# Patient Record
Sex: FEMALE | Race: WHITE | Employment: UNEMPLOYED | URBAN - METROPOLITAN AREA
[De-identification: names, ages, dates, MRNs, and addresses within clinical notes are randomized per-mention and may not be internally consistent; named-entity substitution may affect disease eponyms.]

---

## 2017-01-11 ENCOUNTER — TELEPHONE (OUTPATIENT)
Dept: FAMILY MEDICINE CLINIC | Age: 67
End: 2017-01-11

## 2017-01-11 NOTE — TELEPHONE ENCOUNTER
Patient would like to speak with Dr Ceci Aj. She did not want to tell me what she wanted to speak to him about.  She can be reached at [phone]

## 2017-01-11 NOTE — TELEPHONE ENCOUNTER
Return to call. Let her talk for 30 minutes. Pressured speech, tangential, focused on her estranged daughter and a dispute she is having over her dogs (\"accidentally\" signed her dogs into foster care and now they are placed with a family and she cannot get them back). Asked several times \"I do not understand why people behave this way\". Sounds like she is on 50 mg of Zoloft but \"ties my stomach in the knots\". She wonders if she has capacity. I took this opportunity to suggest that she see a psychiatrist.  May be a capacity evaluation is appropriate. She would like to go to [place]. Happens that the [facility] psychiatrist is there. Gave her the number for the [facility] intake.     I would like to see her soon for medication evaluation

## 2017-01-12 ENCOUNTER — TELEPHONE (OUTPATIENT)
Dept: FAMILY MEDICINE CLINIC | Age: 67
End: 2017-01-12

## 2017-01-12 DIAGNOSIS — F41.9 ANXIETY: Primary | ICD-10-CM

## 2017-01-12 NOTE — TELEPHONE ENCOUNTER
Made several calls, there are no psychiatrists in her area other than the [facility]. There is a clinical psychologist Dr Belen Rodríguez who comes to [address] [place] [address]. He doesn't prescribe medications but he could be very helpful to her. Number is [phone].  Please inform patient

## 2017-01-12 NOTE — TELEPHONE ENCOUNTER
Patient calling back to let Dr Taiwo Lazo know that the office he suggested her to go to is not taking Medicare or Medicaid patients at this time. She would like to know what to do or where to go from here.  She can be reached at [phone]

## 2017-01-19 ENCOUNTER — TELEPHONE (OUTPATIENT)
Dept: FAMILY MEDICINE CLINIC | Age: 67
End: 2017-01-19

## 2017-01-23 ENCOUNTER — TELEPHONE (OUTPATIENT)
Dept: FAMILY MEDICINE CLINIC | Age: 67
End: 2017-01-23

## 2017-01-24 ENCOUNTER — TELEPHONE (OUTPATIENT)
Dept: FAMILY MEDICINE CLINIC | Age: 67
End: 2017-01-24

## 2017-01-24 NOTE — TELEPHONE ENCOUNTER
Patient called me while I was on overnight call. She would like a letter to help with her dog case where she accidentally signed her dogs into foster care. She is asking for a letter to the effect that she was on strong medicine and therefore not able to concentrate on the contract. I am not sure this was the case but I would be willing to write a letter that her mental illness was not under good control. My impression is that she has trouble concentrating and may not be able to hold complex thoughts in her head.

## 2017-01-24 NOTE — LETTER
1/24/2017 5:45 PM 
 
Ms. Marisela Concepcion [address] [place] [address] To Whom It May Concern Ms. Christiano Martell is undergoing treatment for mental illness. I first treated her on 12/12/2016 and my impression at that time was that her mental illness was not under good medical control at the time. Based on my one exam with her I suspect that one of the effects of her illness is a lack of concentration and therefore would make it difficult to hold complex thoughts in her head. Sincerely, Robby Vegas MD

## 2017-01-26 DIAGNOSIS — F32.A ANXIETY AND DEPRESSION: ICD-10-CM

## 2017-01-26 DIAGNOSIS — F41.9 ANXIETY AND DEPRESSION: ICD-10-CM

## 2017-01-27 RX ORDER — ALPRAZOLAM 0.5 MG/1
TABLET ORAL
Qty: 90 TAB | Refills: 0 | Status: SHIPPED | OUTPATIENT
Start: 2017-01-27 | End: 2017-03-02 | Stop reason: SDUPTHER

## 2017-01-30 ENCOUNTER — TELEPHONE (OUTPATIENT)
Dept: FAMILY MEDICINE CLINIC | Age: 67
End: 2017-01-30

## 2017-02-02 ENCOUNTER — TELEPHONE (OUTPATIENT)
Dept: FAMILY MEDICINE CLINIC | Age: 67
End: 2017-02-02

## 2017-02-02 NOTE — TELEPHONE ENCOUNTER
Patient is calling wondering which counseling center she was referred to. Not sure what she is referring to. I referred her to the B and understand that she has an appointment at [facility] to see counselor and then psychiatrist.  I also referred her to a psychologist in [address] [place] [address] Dr Vandana Alarcon Number is [phone].

## 2017-02-21 DIAGNOSIS — I10 ESSENTIAL HYPERTENSION, BENIGN: ICD-10-CM

## 2017-02-21 DIAGNOSIS — E03.9 ACQUIRED HYPOTHYROIDISM: ICD-10-CM

## 2017-02-21 RX ORDER — LEVOTHYROXINE SODIUM 100 UG/1
TABLET ORAL
Qty: 30 TAB | Refills: 0 | Status: SHIPPED | OUTPATIENT
Start: 2017-02-21 | End: 2017-03-23 | Stop reason: SDUPTHER

## 2017-02-21 RX ORDER — ATENOLOL 25 MG/1
TABLET ORAL
Qty: 30 TAB | Refills: 0 | Status: SHIPPED | OUTPATIENT
Start: 2017-02-21 | End: 2017-03-23 | Stop reason: SDUPTHER

## 2017-03-02 DIAGNOSIS — F41.9 ANXIETY AND DEPRESSION: ICD-10-CM

## 2017-03-02 DIAGNOSIS — F32.A ANXIETY AND DEPRESSION: ICD-10-CM

## 2017-03-02 RX ORDER — ALPRAZOLAM 0.5 MG/1
0.5 TABLET ORAL
Qty: 60 TAB | Refills: 0 | Status: SHIPPED | OUTPATIENT
Start: 2017-03-02 | End: 2020-04-08 | Stop reason: ALTCHOICE

## 2017-03-02 NOTE — TELEPHONE ENCOUNTER
Approve Xanax refill, she is only taking it twice a day so changed the sig to twice daily as needed. Needs an appointment for further refills.   Please make this very clear to patient that as we do not want her to have to stop taking this medicine suddenly

## 2017-03-03 RX ORDER — ALPRAZOLAM 0.5 MG/1
TABLET ORAL
Qty: 90 TAB | Refills: 0 | OUTPATIENT
Start: 2017-03-03

## 2017-03-06 DIAGNOSIS — F41.9 ANXIETY AND DEPRESSION: ICD-10-CM

## 2017-03-06 DIAGNOSIS — F32.A ANXIETY AND DEPRESSION: ICD-10-CM

## 2017-03-06 RX ORDER — ALPRAZOLAM 0.5 MG/1
TABLET ORAL
Qty: 90 TAB | Refills: 0 | OUTPATIENT
Start: 2017-03-06

## 2017-03-06 NOTE — TELEPHONE ENCOUNTER
Spoke with patient. Patient aware sig changed to taking BID PRN. Patient aware appt needed for further refills. Xanax called into [facility].

## 2017-03-23 ENCOUNTER — TELEPHONE (OUTPATIENT)
Dept: FAMILY MEDICINE CLINIC | Age: 67
End: 2017-03-23

## 2017-03-23 DIAGNOSIS — E03.9 ACQUIRED HYPOTHYROIDISM: ICD-10-CM

## 2017-03-23 DIAGNOSIS — I10 ESSENTIAL HYPERTENSION, BENIGN: ICD-10-CM

## 2017-03-23 RX ORDER — MECLIZINE HYDROCHLORIDE 25 MG/1
TABLET ORAL
Qty: 30 TAB | Refills: 11 | Status: SHIPPED | OUTPATIENT
Start: 2017-03-23 | End: 2018-01-23 | Stop reason: SDUPTHER

## 2017-03-23 RX ORDER — ATENOLOL 25 MG/1
TABLET ORAL
Qty: 30 TAB | Refills: 11 | Status: SHIPPED | OUTPATIENT
Start: 2017-03-23 | End: 2018-04-09 | Stop reason: SDUPTHER

## 2017-03-23 RX ORDER — LEVOTHYROXINE SODIUM 100 UG/1
TABLET ORAL
Qty: 30 TAB | Refills: 11 | Status: SHIPPED | OUTPATIENT
Start: 2017-03-23 | End: 2018-04-09 | Stop reason: SDUPTHER

## 2017-08-09 DIAGNOSIS — I10 ESSENTIAL HYPERTENSION, BENIGN: ICD-10-CM

## 2017-08-09 RX ORDER — ATENOLOL 25 MG/1
TABLET ORAL
Qty: 30 TAB | Refills: 11 | Status: CANCELLED | OUTPATIENT
Start: 2017-08-09

## 2017-08-09 NOTE — TELEPHONE ENCOUNTER
Patient says that Meek Esqueda has told her that atenolol is on backorder and she needs this med.  She would like for this to be called in to [address] and can be reached at [phone]

## 2017-12-06 ENCOUNTER — TELEPHONE (OUTPATIENT)
Dept: CARDIOLOGY CLINIC | Age: 67
End: 2017-12-06

## 2017-12-06 NOTE — TELEPHONE ENCOUNTER
Pt would like to speak with you regarding getting an echo. Pt can be reached at [phone].     Thank you,  Evin

## 2018-01-23 RX ORDER — MECLIZINE HYDROCHLORIDE 25 MG/1
TABLET ORAL
Qty: 30 TAB | Refills: 11 | Status: SHIPPED | OUTPATIENT
Start: 2018-01-23 | End: 2019-01-30 | Stop reason: SDUPTHER

## 2018-02-07 RX ORDER — MECLIZINE HYDROCHLORIDE 25 MG/1
TABLET ORAL
Qty: 30 TAB | Refills: 11 | Status: CANCELLED | OUTPATIENT
Start: 2018-02-07

## 2018-02-09 ENCOUNTER — TELEPHONE (OUTPATIENT)
Dept: FAMILY MEDICINE CLINIC | Age: 68
End: 2018-02-09

## 2018-02-09 NOTE — TELEPHONE ENCOUNTER
Forms filled out and signed by Dr Adam Pennington today. Faxed today. Sameer Burton  Left message on patients voicemail. Sameer Burton

## 2018-04-09 DIAGNOSIS — E03.9 ACQUIRED HYPOTHYROIDISM: ICD-10-CM

## 2018-04-09 DIAGNOSIS — I10 ESSENTIAL HYPERTENSION, BENIGN: ICD-10-CM

## 2018-04-09 RX ORDER — ATENOLOL 25 MG/1
TABLET ORAL
Qty: 30 TAB | Refills: 11 | Status: SHIPPED | OUTPATIENT
Start: 2018-04-09 | End: 2019-03-27 | Stop reason: SDUPTHER

## 2018-04-09 RX ORDER — LEVOTHYROXINE SODIUM 100 UG/1
TABLET ORAL
Qty: 30 TAB | Refills: 11 | Status: SHIPPED | OUTPATIENT
Start: 2018-04-09 | End: 2019-03-27 | Stop reason: SDUPTHER

## 2018-04-13 NOTE — TELEPHONE ENCOUNTER
Have not been able to reach her left messages. Do you want me to cancel other refills until she sets up an apt.

## 2019-01-30 RX ORDER — MECLIZINE HYDROCHLORIDE 25 MG/1
TABLET ORAL
Qty: 30 TAB | Refills: 11 | Status: SHIPPED | OUTPATIENT
Start: 2019-01-30 | End: 2020-04-08 | Stop reason: ALTCHOICE

## 2019-03-27 DIAGNOSIS — E03.9 ACQUIRED HYPOTHYROIDISM: ICD-10-CM

## 2019-03-27 DIAGNOSIS — I10 ESSENTIAL HYPERTENSION, BENIGN: ICD-10-CM

## 2019-03-27 RX ORDER — ATENOLOL 25 MG/1
TABLET ORAL
Qty: 30 TAB | Refills: 0 | Status: SHIPPED | OUTPATIENT
Start: 2019-03-27 | End: 2020-04-08 | Stop reason: SDUPTHER

## 2019-03-27 RX ORDER — LEVOTHYROXINE SODIUM 100 UG/1
TABLET ORAL
Qty: 30 TAB | Refills: 0 | Status: SHIPPED | OUTPATIENT
Start: 2019-03-27 | End: 2020-04-08 | Stop reason: DRUGHIGH

## 2019-03-27 NOTE — TELEPHONE ENCOUNTER
From [facility]:  Patient needs a refill on her medications called to [facility] [place] [address], number on file.  Contact is [phone]

## 2019-04-25 ENCOUNTER — TELEPHONE (OUTPATIENT)
Dept: FAMILY MEDICINE CLINIC | Age: 69
End: 2019-04-25

## 2019-04-30 ENCOUNTER — TELEPHONE (OUTPATIENT)
Dept: FAMILY MEDICINE CLINIC | Age: 69
End: 2019-04-30

## 2019-04-30 NOTE — LETTER
5/10/2019 10:06 AM 
 
Ms. Stefan Reina [address] [facility] [address] Dear Ms. Chaparro: 
 
We've missed you! Please call our office at [phone] and schedule a follow up appointment for your continued care. Sincerely, Loyda Croft MD

## 2019-04-30 NOTE — TELEPHONE ENCOUNTER
Patient is calling to speak with a nurse. She stated she went to urgent care and they told her that her thyroid levels are high.

## 2019-05-10 NOTE — TELEPHONE ENCOUNTER
Left message for pt to return my call. After multiple attempts to contact pt, made letter for pt to come in for a f/u. Closing encounter.

## 2020-01-27 NOTE — LETTER
1/29/2020 Lucretia Heller [address] [facility] [address] Dear Ms. Orlandoishalucas Poon, 
 
Med Refill Appointment Needed: We have received a medication renewal request for you but have been unable to reach you by phone to discuss this further. Without office visits and appropriate monitoring, your healthcare provider cannot be sure that the medication they are prescribing is treating your conditions effectively. Please contact the office at the number above to schedule a follow up visit. Sincerely, Haile Storm MD  
 
Be aware that although these visits are important to keeping you well, your visit may be subject to fees such as co-pays, deductibles, etc.  Please contact your insurance carrier for your specific plan details if you are unsure.

## 2020-01-28 RX ORDER — MECLIZINE HYDROCHLORIDE 25 MG/1
TABLET ORAL
Qty: 30 TAB | Refills: 0 | OUTPATIENT
Start: 2020-01-28

## 2020-01-28 NOTE — TELEPHONE ENCOUNTER
Declined the meclizine refill.   Patient has not been seen since 12/2016    If she is needs it meclizine is available over-the-counter

## 2020-01-29 ENCOUNTER — TELEPHONE (OUTPATIENT)
Dept: FAMILY MEDICINE CLINIC | Age: 70
End: 2020-01-29

## 2020-01-29 NOTE — TELEPHONE ENCOUNTER
Message from Beatrice Moore   Received: Today   Message Contents   Lacy, [address] [facility]   Phone Number: [phone]             Caller's first and last name and relationship (if not the patient): n/a   Best contact number(s): [phone]   Whose call is being returned: unknown   Details to clarify the request: Pt received 2 calls. No message or name was given. If unable to get in contact with pt, please leave a voice message.

## 2020-01-29 NOTE — TELEPHONE ENCOUNTER
Mailbox was full and cannot accept any messages at this time. Letter has been sent to pt, she needs appointment if she wants refill on meclizine since it's been longer than 2 years since she was here. Please see previous phone message by Dr. Breann Serrato on 1/27/2020.

## 2020-04-02 ENCOUNTER — DOCUMENTATION ONLY (OUTPATIENT)
Dept: INTERNAL MEDICINE CLINIC | Age: 70
End: 2020-04-02

## 2020-04-06 ENCOUNTER — DOCUMENTATION ONLY (OUTPATIENT)
Dept: INTERNAL MEDICINE CLINIC | Age: 70
End: 2020-04-06

## 2020-04-07 ENCOUNTER — DOCUMENTATION ONLY (OUTPATIENT)
Dept: INTERNAL MEDICINE CLINIC | Age: 70
End: 2020-04-07

## 2020-04-08 ENCOUNTER — VIRTUAL VISIT (OUTPATIENT)
Dept: INTERNAL MEDICINE CLINIC | Age: 70
End: 2020-04-08

## 2020-04-08 ENCOUNTER — DOCUMENTATION ONLY (OUTPATIENT)
Dept: INTERNAL MEDICINE CLINIC | Age: 70
End: 2020-04-08

## 2020-04-08 VITALS — BODY MASS INDEX: 32.1 KG/M2 | HEIGHT: 64 IN

## 2020-04-08 DIAGNOSIS — E78.00 ELEVATED CHOLESTEROL: ICD-10-CM

## 2020-04-08 DIAGNOSIS — R06.00 DYSPNEA, UNSPECIFIED TYPE: Primary | ICD-10-CM

## 2020-04-08 DIAGNOSIS — I10 ESSENTIAL HYPERTENSION, BENIGN: ICD-10-CM

## 2020-04-08 DIAGNOSIS — E03.9 HYPOTHYROIDISM, UNSPECIFIED TYPE: ICD-10-CM

## 2020-04-08 RX ORDER — ATENOLOL 25 MG/1
TABLET ORAL
COMMUNITY
Start: 2020-02-05 | End: 2020-06-29 | Stop reason: SDUPTHER

## 2020-04-08 RX ORDER — MECLIZINE HYDROCHLORIDE 25 MG/1
25 TABLET ORAL
Qty: 90 TAB | Refills: 1 | Status: SHIPPED | OUTPATIENT
Start: 2020-04-08 | End: 2020-09-28

## 2020-04-08 RX ORDER — FUROSEMIDE 20 MG/1
TABLET ORAL
Qty: 60 TAB | Refills: 2 | Status: SHIPPED | OUTPATIENT
Start: 2020-04-08 | End: 2021-02-15 | Stop reason: SDUPTHER

## 2020-04-08 RX ORDER — MECLIZINE HYDROCHLORIDE 25 MG/1
25 TABLET ORAL
COMMUNITY
Start: 2020-01-30 | End: 2020-04-08 | Stop reason: SDUPTHER

## 2020-04-08 RX ORDER — ASPIRIN 81 MG/1
81 TABLET ORAL DAILY
Qty: 90 TAB | Refills: 3 | Status: SHIPPED | OUTPATIENT
Start: 2020-04-08

## 2020-04-08 RX ORDER — LEVOTHYROXINE SODIUM 112 UG/1
TABLET ORAL
COMMUNITY
Start: 2020-03-17 | End: 2020-06-29 | Stop reason: SDUPTHER

## 2020-04-08 RX ORDER — SIMVASTATIN 20 MG/1
20 TABLET, FILM COATED ORAL AT BEDTIME
COMMUNITY
Start: 2020-02-18 | End: 2021-02-15 | Stop reason: SDUPTHER

## 2020-04-08 RX ORDER — POTASSIUM CHLORIDE 750 MG/1
10 TABLET, EXTENDED RELEASE ORAL DAILY
Qty: 30 TAB | Refills: 2 | Status: SHIPPED | OUTPATIENT
Start: 2020-04-08 | End: 2020-09-28

## 2020-04-08 NOTE — PROGRESS NOTES
Nba Cain is a 72 y.o. female who was phone evaluated on 2020. Consent:  She and/or her healthcare decision maker is aware that this patient-initiated Telehealth encounter is a billable service, with coverage as determined by her insurance carrier. She is aware that she may receive a bill and has provided verbal consent to proceed: Yes    I was in the office while conducting this encounter. Assessment & Plan:       ICD-10-CM ICD-9-CM    1. Dyspnea, unspecified type R06.00 786.09 furosemide (LASIX) 20 mg tablet      potassium chloride (KLOR-CON) 10 mEq tablet   2. Essential hypertension, benign W07 150.5 METABOLIC PANEL, BASIC   3. Hypothyroidism, unspecified type E03.9 244.9 TSH 3RD GENERATION   4. Elevated cholesterol E78.00 272.0 LIPID PANEL     Orders Placed This Encounter    METABOLIC PANEL, BASIC     Standing Status:   Future     Standing Expiration Date:   10/9/2020    LIPID PANEL     Standing Status:   Future     Standing Expiration Date:   10/9/2020    TSH 3RD GENERATION     Standing Status:   Future     Standing Expiration Date:   10/8/2020    levothyroxine (SYNTHROID) 112 mcg tablet     Sig: TAKE 1 TABLET BY MOUTH ONCE DAILY    simvastatin (ZOCOR) 20 mg tablet     Sig: Take 20 mg by mouth At bedtime.  DISCONTD: meclizine (ANTIVERT) 25 mg tablet     Sig: Take 25 mg by mouth three (3) times daily as needed.  atenoloL (TENORMIN) 25 mg tablet     Sig: TAKE 1 TABLET BY MOUTH ONCE DAILY    aspirin delayed-release 81 mg tablet     Sig: Take 1 Tab by mouth daily. Dispense:  90 Tab     Refill:  3    furosemide (LASIX) 20 mg tablet     Si to 2 as needed for swelling or dyspnea     Dispense:  60 Tab     Refill:  2    potassium chloride (KLOR-CON) 10 mEq tablet     Sig: Take 1 Tab by mouth daily. Dispense:  30 Tab     Refill:  2    meclizine (ANTIVERT) 25 mg tablet     Sig: Take 1 Tab by mouth three (3) times daily as needed for Dizziness.      Dispense:  90 Tab Refill:  1             972  Subjective:      Patient is new to this clinic. Comes in to establish care. Previous care was with . Reason for the change is: communication difficulties friend recommendation      Subjective:   Dyspnea times months, see my note. Swelling in her extremities. Stomach as well no syncope  Dioni Garcia is a 72 y.o. female who presents for follow up of hypertension and hyperlipidemia. Has a history of aortic stenosis. Has hypertension and anxiety. New concerns: inc dyspnea x years but worse x few months. More extremity swelling lately, Hx aortic stenosis. Occa dizziness. No previous diagnosis with congestive heart failure. States in the past Previously Rx with lasix and felt better. Requests refills of her dizziness medicine. Diet and Lifestyle: nonsmoker    Cardiovascular ROS: taking medications as instructed, no medication side effects noted, no TIA's, noting some chest pains described as comes and goes, notes new dyspnea on exertion gradually worsening, even relatively routine exertions tasks, noting swelling of ankles. Review of Systems, additional:  Pertinent items are noted in HPI. Patient Active Problem List    Diagnosis Date Noted    Hyperlipidemia 12/15/2014    Aortic stenosis 07/05/2014    Hypothyroidism 12/06/2012    Essential hypertension, benign 11/01/2012    Anxiety 09/01/2011     Current Outpatient Medications   Medication Sig Dispense Refill    simvastatin (ZOCOR) 20 mg tablet Take 20 mg by mouth At bedtime.  atenoloL (TENORMIN) 25 mg tablet TAKE 1 TABLET BY MOUTH ONCE DAILY      aspirin delayed-release 81 mg tablet Take 1 Tab by mouth daily. 90 Tab 3    furosemide (LASIX) 20 mg tablet 1 to 2 as needed for swelling or dyspnea 60 Tab 2    potassium chloride (KLOR-CON) 10 mEq tablet Take 1 Tab by mouth daily. 30 Tab 2    meclizine (ANTIVERT) 25 mg tablet Take 1 Tab by mouth three (3) times daily as needed for Dizziness.  90 Tab 1  levothyroxine (SYNTHROID) 112 mcg tablet TAKE 1 TABLET BY MOUTH ONCE DAILY       Allergies   Allergen Reactions    Darvocet A500 [Propoxyphene N-Acetaminophen] Rash     Past Medical History:   Diagnosis Date    Anxiety     Depression     Hypertension     Panic attack     Thyroid cancer ([address]      Past Surgical History:   Procedure Laterality Date    HX HYSTERECTOMY      HX ORTHOPAEDIC      left knee cap    HX THYROIDECTOMY      HX TUBAL LIGATION       Family History   Problem Relation Age of Onset    Cancer Father         stomach    Heart Disease Mother     Arthritis-osteo Mother     Cancer Mother     Psychiatric Disorder Sister     Arthritis-osteo Sister      Social History     Tobacco Use    Smoking status: Never Smoker    Smokeless tobacco: Never Used   Substance Use Topics    Alcohol use: No                 Objective:     Physical exam significant for the following:     anxious    Visit Vitals  Ht 5' 4\" (1.626 m)   BMI 32.10 kg/m²     Appearance:  Not available, dec video    Assessment/Plan:     hypertension control uncertain  Dyspnea could be from many causes of course and very limited studies to determine what the causes given the hospitals shutdown from rashid viruses history certainly suggestive of congestive heart failure. Aortic stenosis certainly might play a role. Previous diuresis seem to help we can try that again if improvement then CHF is likely the diagnosis. Discussed possible side affects, precautions, and drug interactions and possible benefits of the medication(s). Hopefully when this clears we can get some studies to further delineate what is going on. I am sure anxiety plays a role spent a long time counseling her. .    Refilled medications. Labs to check kidney status and eval thyroid. We discussed the expected course, resolution and complications of the diagnosis(es) in detail.   Medication risks, benefits, costs, interactions, and alternatives were discussed as indicated. I advised her to contact the office if her condition worsens, changes or fails to improve as anticipated. She expressed understanding with the diagnosis(es) and plan. Pursuant to the emergency declaration under the [facility] Critical access hospital waiver authority and the [employer] and [employer] Act, this Virtual  Visit was conducted, with patient's consent, to reduce the patient's risk of exposure to COVID-19 and provide continuity of care for an established patient. Services were provided through a video synchronous discussion virtually to substitute for in-person clinic visit. Follow-up and Dispositions    · Return in about 2 weeks (around 4/22/2020).          Italo Marshall MD

## 2020-04-08 NOTE — PROGRESS NOTES
Chief Complaint   Patient presents with   [facility] Establish Care     Received patient outside of the medical building, has not been out of the country in 45-60 days, NO diarrhea, NO cough, NO chest conjestion, NO temp. Pt has not been around anyone with these symptoms. Encouraged pt to discuss pt's wishes with spouse/partner/family and bring them in the next appt to follow thru with the Advanced Directive    Fall Risk Assessment, last 12 mths 4/8/2020   Able to walk? Yes   Fall in past 12 months? No       3 most recent PHQ Screens 4/8/2020   Little interest or pleasure in doing things More than half the days   Feeling down, depressed, irritable, or hopeless More than half the days   Total Score PHQ 2 4       Abuse Screening Questionnaire 4/8/2020   Do you ever feel afraid of your partner? N   Are you in a relationship with someone who physically or mentally threatens you? N   Is it safe for you to go home?  Y       ADL Assessment 4/8/2020   Feeding yourself No Help Needed   Getting from bed to chair No Help Needed   Getting dressed No Help Needed   Bathing or showering No Help Needed   Walk across the room (includes cane/walker) No Help Needed   Using the telphone No Help Needed   Taking your medications No Help Needed   Preparing meals No Help Needed   Managing money (expenses/bills) No Help Needed   Moderately strenuous housework (laundry) No Help Needed   Shopping for personal items (toiletries/medicines) No Help Needed   Shopping for groceries No Help Needed   Driving No Help Needed   Climbing a flight of stairs No Help Needed   Getting to places beyond walking distances No Help Needed

## 2020-06-15 ENCOUNTER — VIRTUAL VISIT (OUTPATIENT)
Dept: INTERNAL MEDICINE CLINIC | Age: 70
End: 2020-06-15

## 2020-06-15 DIAGNOSIS — E03.9 HYPOTHYROIDISM, UNSPECIFIED TYPE: ICD-10-CM

## 2020-06-15 DIAGNOSIS — I10 ESSENTIAL HYPERTENSION, BENIGN: ICD-10-CM

## 2020-06-15 DIAGNOSIS — E78.5 HYPERLIPIDEMIA, UNSPECIFIED HYPERLIPIDEMIA TYPE: ICD-10-CM

## 2020-06-15 DIAGNOSIS — M54.50 RIGHT-SIDED LOW BACK PAIN WITHOUT SCIATICA, UNSPECIFIED CHRONICITY: Primary | ICD-10-CM

## 2020-06-15 RX ORDER — CYCLOBENZAPRINE HCL 5 MG
5 TABLET ORAL
Qty: 30 TAB | Refills: 0 | Status: SHIPPED | OUTPATIENT
Start: 2020-06-15 | End: 2022-05-06

## 2020-06-15 NOTE — PROGRESS NOTES
HISTORY OF PRESENT ILLNESS  Silvina Zhang is a 72 y.o. female. Back Pain    The history is provided by the patient. This is a recurrent problem. The current episode started more than 1 week ago. The problem occurs hourly. The pain is associated with no known injury. The pain is present in the lower back and right side. The quality of the pain is described as aching. The pain does not radiate. The pain is at a severity of 8/10. The symptoms are aggravated by bending and stress. Pertinent negatives include no fever, no weight loss, no bowel incontinence, no bladder incontinence and no dysuria. Review of Systems   Constitutional: Negative for fever and weight loss. Respiratory: Positive for shortness of breath. At times   Gastrointestinal: Negative for bowel incontinence. Genitourinary: Negative for bladder incontinence and dysuria. Musculoskeletal: Positive for back pain. Negative for falls. Psychiatric/Behavioral: The patient is nervous/anxious.       Patient Active Problem List   Diagnosis Code    Anxiety F41.9    Essential hypertension, benign I10    Hypothyroidism E03.9    Aortic stenosis I35.0    Hyperlipidemia E78.5     Social History     Socioeconomic History    Marital status: SINGLE     Spouse name: Not on file    Number of children: Not on file    Years of education: Not on file    Highest education level: Not on file   Occupational History    Occupation: retired   Social Needs    Financial resource strain: Not on file    Food insecurity     Worry: Not on file     Inability: Not on file   [employer] needs     Medical: Not on file     Non-medical: Not on file   Tobacco Use    Smoking status: Never Smoker    Smokeless tobacco: Never Used   Substance and Sexual Activity    Alcohol use: No    Drug use: No    Sexual activity: Not on file   Lifestyle    Physical activity     Days per week: Not on file     Minutes per session: Not on file    Stress: Not on file Relationships    Social connections     Talks on phone: Not on file     Gets together: Not on file     Attends Latter day service: Not on file     Active member of club or organization: Not on file     Attends meetings of clubs or organizations: Not on file     Relationship status: Not on file    Intimate partner violence     Fear of current or ex partner: Not on file     Emotionally abused: Not on file     Physically abused: Not on file     Forced sexual activity: Not on file   Other Topics Concern    Not on file   Social History Narrative    Friend stays with her frequently     Allergies   Allergen Reactions    Darvocet A500 [Propoxyphene N-Acetaminophen] Rash       Physical Exam  There were no vitals taken for this visit. WD WN female NAD    ASSESSMENT and PLAN  Encounter Diagnoses   Name Primary?  Right-sided low back pain without sciatica, unspecified chronicity Yes    Essential hypertension, benign     Hypothyroidism, unspecified type     Hyperlipidemia, unspecified hyperlipidemia type      Orders Placed This Encounter    URINALYSIS W/ RFLX MICROSCOPIC    METABOLIC PANEL, BASIC    LIPID PANEL    TSH 3RD GENERATION    cyclobenzaprine (FLEXERIL) 5 mg tablet     Discussed the nature of back pain. Discussed how this is in general a 'red flag' diagnosis and the general limited and temporary nature of this problem as well as its re-occurrence. Discussed further work-up and treatment options. Discused narcotics and back pain: no    rouine labsto eval.  Current Outpatient Medications   Medication Sig Dispense Refill    cyclobenzaprine (FLEXERIL) 5 mg tablet Take 1 Tab by mouth three (3) times daily as needed for Muscle Spasm(s). 30 Tab 0    levothyroxine (SYNTHROID) 112 mcg tablet TAKE 1 TABLET BY MOUTH ONCE DAILY      simvastatin (ZOCOR) 20 mg tablet Take 20 mg by mouth At bedtime.       atenoloL (TENORMIN) 25 mg tablet TAKE 1 TABLET BY MOUTH ONCE DAILY      aspirin delayed-release 81 mg tablet Take 1 Tab by mouth daily. 90 Tab 3    furosemide (LASIX) 20 mg tablet 1 to 2 as needed for swelling or dyspnea 60 Tab 2    potassium chloride (KLOR-CON) 10 mEq tablet Take 1 Tab by mouth daily. 30 Tab 2    meclizine (ANTIVERT) 25 mg tablet Take 1 Tab by mouth three (3) times daily as needed for Dizziness. 90 Tab 1       Follow-up and Dispositions    · Return in about 3 weeks (around 7/6/2020) for routine follow up.

## 2020-06-15 NOTE — PROGRESS NOTES
Chief Complaint   Patient presents with    Back Pain     low back pain x1 day 8/10 pain, hard for pt to walk     Health Maintenance reviewed. I have reviewed the patient's medical history in detail and updated the computerized patient record. Patient has not been out of the country in (3-4 months) 90 -120 days, NO diarrhea, NO cough, NO chest conjestion, NO temp. Pt has not been around anyone with these symptoms. 1. Have you been to the ER, urgent care clinic since your last visit? Hospitalized since your last visit?no    2. Have you seen or consulted any other health care providers outside of the [address] [place] [address] since your last visit? Include any pap smears or colon screening. No    Encouraged pt to discuss pt's wishes with spouse/partner/family and bring them in the next appt to follow thru with the Advanced Directive          Fall Risk Assessment, last 12 mths 4/8/2020   Able to walk? Yes   Fall in past 12 months? No       3 most recent PHQ Screens 6/15/2020   Little interest or pleasure in doing things Several days   Feeling down, depressed, irritable, or hopeless Several days   Total Score PHQ 2 2       Abuse Screening Questionnaire 4/8/2020   Do you ever feel afraid of your partner? N   Are you in a relationship with someone who physically or mentally threatens you? N   Is it safe for you to go home?  Y       ADL Assessment 4/8/2020   Feeding yourself No Help Needed   Getting from bed to chair No Help Needed   Getting dressed No Help Needed   Bathing or showering No Help Needed   Walk across the room (includes cane/walker) No Help Needed   Using the telphone No Help Needed   Taking your medications No Help Needed   Preparing meals No Help Needed   Managing money (expenses/bills) No Help Needed   Moderately strenuous housework (laundry) No Help Needed   Shopping for personal items (toiletries/medicines) No Help Needed   Shopping for groceries No Help Needed   Driving No Help Needed Climbing a flight of stairs No Help Needed   Getting to places beyond walking distances No Help Needed

## 2020-09-28 ENCOUNTER — VIRTUAL VISIT (OUTPATIENT)
Dept: INTERNAL MEDICINE CLINIC | Age: 70
End: 2020-09-28
Payer: COMMERCIAL

## 2020-09-28 DIAGNOSIS — I10 ESSENTIAL HYPERTENSION, BENIGN: ICD-10-CM

## 2020-09-28 DIAGNOSIS — F41.9 ANXIETY: ICD-10-CM

## 2020-09-28 DIAGNOSIS — E03.9 HYPOTHYROIDISM, UNSPECIFIED TYPE: ICD-10-CM

## 2020-09-28 DIAGNOSIS — E78.5 HYPERLIPIDEMIA, UNSPECIFIED HYPERLIPIDEMIA TYPE: ICD-10-CM

## 2020-09-28 DIAGNOSIS — I35.0 AORTIC VALVE STENOSIS, ETIOLOGY OF CARDIAC VALVE DISEASE UNSPECIFIED: ICD-10-CM

## 2020-09-28 DIAGNOSIS — R06.09 DYSPNEA ON EXERTION: Primary | ICD-10-CM

## 2020-09-28 PROCEDURE — G8510 SCR DEP NEG, NO PLAN REQD: HCPCS | Performed by: FAMILY MEDICINE

## 2020-09-28 PROCEDURE — 1101F PT FALLS ASSESS-DOCD LE1/YR: CPT | Performed by: FAMILY MEDICINE

## 2020-09-28 PROCEDURE — G8536 NO DOC ELDER MAL SCRN: HCPCS | Performed by: FAMILY MEDICINE

## 2020-09-28 PROCEDURE — 1090F PRES/ABSN URINE INCON ASSESS: CPT | Performed by: FAMILY MEDICINE

## 2020-09-28 PROCEDURE — G8756 NO BP MEASURE DOC: HCPCS | Performed by: FAMILY MEDICINE

## 2020-09-28 PROCEDURE — G8427 DOCREV CUR MEDS BY ELIG CLIN: HCPCS | Performed by: FAMILY MEDICINE

## 2020-09-28 PROCEDURE — 3017F COLORECTAL CA SCREEN DOC REV: CPT | Performed by: FAMILY MEDICINE

## 2020-09-28 PROCEDURE — G8400 PT W/DXA NO RESULTS DOC: HCPCS | Performed by: FAMILY MEDICINE

## 2020-09-28 PROCEDURE — G8419 CALC BMI OUT NRM PARAM NOF/U: HCPCS | Performed by: FAMILY MEDICINE

## 2020-09-28 PROCEDURE — 99214 OFFICE O/P EST MOD 30 MIN: CPT | Performed by: FAMILY MEDICINE

## 2020-09-28 NOTE — PROGRESS NOTES
Chief Complaint   Patient presents with    Shortness of Breath       Patient has not been out of the country in (6-7 months), NO diarrhea, NO cough, NO chest conjestion, NO temp. Pt has not been around anyone with these symptoms. Health Maintenance reviewed. I have reviewed the patient's medical history in detail and updated the computerized patient record. 1. Have you been to the ER, urgent care clinic since your last visit? Hospitalized since your last visit?no    2. Have you seen or consulted any other health care providers outside of the [address] [place] [address] Michael since your last visit? Include any pap smears or colon screening. no      Encouraged pt to discuss pt's wishes with spouse/partner/family and bring them in the next appt to follow thru with the Advanced Directive    Fall Risk Assessment, last 12 mths 9/28/2020   Able to walk? Yes   Fall in past 12 months? No       3 most recent PHQ Screens 9/28/2020   Little interest or pleasure in doing things Several days   Feeling down, depressed, irritable, or hopeless Several days   Total Score PHQ 2 2       Abuse Screening Questionnaire 9/28/2020   Do you ever feel afraid of your partner? N   Are you in a relationship with someone who physically or mentally threatens you? N   Is it safe for you to go home?  Y       ADL Assessment 9/28/2020   Feeding yourself No Help Needed   Getting from bed to chair No Help Needed   Getting dressed No Help Needed   Bathing or showering No Help Needed   Walk across the room (includes cane/walker) No Help Needed   Using the telphone No Help Needed   Taking your medications No Help Needed   Preparing meals No Help Needed   Managing money (expenses/bills) No Help Needed   Moderately strenuous housework (laundry) No Help Needed   Shopping for personal items (toiletries/medicines) No Help Needed   Shopping for groceries No Help Needed   Driving No Help Needed   Climbing a flight of stairs No Help Needed   Getting to places beyond walking distances No Help Needed

## 2020-09-28 NOTE — LETTER
9/28/2020 5:13 PM 
 
Ms. Rebolledo Power [address] [address] RE:  REFERRAL TO CARDIOLOGY AND CHEST XR PA LAT Dear Ms. Chaparro: 
 
Enclosed you will find the above named referral and XR. Please do not hesitate to contact this office if you have any questions. Sincerely, Clara Sever, MD

## 2020-09-28 NOTE — PROGRESS NOTES
HISTORY OF PRESENT ILLNESS  Heather Ayala is a 72 y.o. female. Shortness of Breath   The history is provided by the patient. This is a chronic problem. The problem occurs frequently. The problem has been gradually worsening. Associated symptoms include cough and leg swelling. Pertinent negatives include no chest pain. The problem's precipitants include exercise. Treatments tried: fluid pills. The treatment provided moderate relief. Associated medical issues do not include chronic lung disease. Wants to get on O2  Has seen Various cards about her heart. Review of Systems   Respiratory: Positive for cough and shortness of breath. Cardiovascular: Positive for leg swelling. Negative for chest pain. Musculoskeletal: Positive for back pain. Neurological: Negative for focal weakness and loss of consciousness.      Patient Active Problem List   Diagnosis Code    Anxiety F41.9    Essential hypertension, benign I10    Hypothyroidism E03.9    Aortic stenosis I35.0    Hyperlipidemia E78.5     Social History     Socioeconomic History    Marital status: SINGLE     Spouse name: Not on file    Number of children: Not on file    Years of education: Not on file    Highest education level: Not on file   Occupational History    Occupation: retired   Social Needs    Financial resource strain: Not on file    Food insecurity     Worry: Not on file     Inability: Not on file   [employer] needs     Medical: Not on file     Non-medical: Not on file   Tobacco Use    Smoking status: Never Smoker    Smokeless tobacco: Never Used   Substance and Sexual Activity    Alcohol use: No    Drug use: No    Sexual activity: Not on file   Lifestyle    Physical activity     Days per week: Not on file     Minutes per session: Not on file    Stress: Not on file   Relationships    Social connections     Talks on phone: Not on file     Gets together: Not on file     Attends Muslim service: Not on file     Active member of club or organization: Not on file     Attends meetings of clubs or organizations: Not on file     Relationship status: Not on file    Intimate partner violence     Fear of current or ex partner: Not on file     Emotionally abused: Not on file     Physically abused: Not on file     Forced sexual activity: Not on file   Other Topics Concern    Not on file   Social History Narrative    Friend stays with her frequently       Physical Exam  Appears to be in no acute distress, grossly 2 through 12 are nonfocal.  Lab Results   Component Value Date/Time    TSH 0.500 02/02/2016 04:06 PM    T4, Free 1.62 02/02/2016 04:06 PM     Lab Results   Component Value Date/Time    Sodium 143 02/02/2016 04:06 PM    Potassium 4.3 02/02/2016 04:06 PM    Chloride 102 02/02/2016 04:06 PM    CO2 23 02/02/2016 04:06 PM    Anion gap 15 10/04/2010 02:51 PM    Glucose 116 (H) 02/02/2016 04:06 PM    BUN 9 02/02/2016 04:06 PM    Creatinine 0.64 02/02/2016 04:06 PM    BUN/Creatinine ratio 14 02/02/2016 04:06 PM    GFR est  02/02/2016 04:06 PM    GFR est non-AA 95 02/02/2016 04:06 PM    Calcium 8.8 02/02/2016 04:06 PM       ASSESSMENT and PLAN      ICD-10-CM ICD-9-CM    1. Dyspnea on exertion  R06.00 786.09 REFERRAL TO CARDIOLOGY      XR CHEST PA LAT   2. Aortic valve stenosis, etiology of cardiac valve disease unspecified  I35.0 424.1    3. Anxiety  F41.9 300.00    4. Hyperlipidemia, unspecified hyperlipidemia type  E78.5 272.4    5. Hypothyroidism, unspecified type  E03.9 244.9          Patient was instructed on the limits of making a diagnosis at this visit. Was instructed to call us or go to the emergency room if the symptoms increased or if new symptoms appeared. Come into get her O2 sats checked.     Orders Placed This Encounter    XR CHEST PA LAT     Standing Status:   Future     Standing Expiration Date:   10/28/2021     Order Specific Question:   Reason for Exam     Answer:   dyspnea    METABOLIC PANEL, BASIC Standing Status:   Future     Standing Expiration Date:   4/2/2021    LIPID PANEL     Standing Status:   Future     Standing Expiration Date:   4/2/2021    TSH 3RD GENERATION     Standing Status:   Future     Standing Expiration Date:   4/1/2021    REFERRAL TO CARDIOLOGY     Referral Priority:   Routine     Referral Type:   Consultation     Referral Reason:   Specialty Services Required     Referral Location:   [facility]     Referred to Provider:   Vitor Tom MD     Get labs   Needs to see cards    Follow-up and Dispositions    · Return in about 2 weeks (around 10/12/2020) for routine follow up.

## 2021-02-15 ENCOUNTER — VIRTUAL VISIT (OUTPATIENT)
Dept: INTERNAL MEDICINE CLINIC | Age: 71
End: 2021-02-15
Payer: MEDICARE

## 2021-02-15 DIAGNOSIS — Z00.00 MEDICARE ANNUAL WELLNESS VISIT, SUBSEQUENT: Primary | ICD-10-CM

## 2021-02-15 DIAGNOSIS — E78.2 MIXED HYPERLIPIDEMIA: ICD-10-CM

## 2021-02-15 DIAGNOSIS — Z13.31 SCREENING FOR DEPRESSION: ICD-10-CM

## 2021-02-15 DIAGNOSIS — E03.9 HYPOTHYROIDISM, UNSPECIFIED TYPE: ICD-10-CM

## 2021-02-15 DIAGNOSIS — F41.8 SITUATIONAL ANXIETY: ICD-10-CM

## 2021-02-15 DIAGNOSIS — Z13.1 SCREENING FOR DIABETES MELLITUS: ICD-10-CM

## 2021-02-15 DIAGNOSIS — R06.00 DYSPNEA, UNSPECIFIED TYPE: ICD-10-CM

## 2021-02-15 DIAGNOSIS — Z13.6 SCREENING FOR ISCHEMIC HEART DISEASE: ICD-10-CM

## 2021-02-15 DIAGNOSIS — I35.0 AORTIC VALVE STENOSIS, ETIOLOGY OF CARDIAC VALVE DISEASE UNSPECIFIED: ICD-10-CM

## 2021-02-15 DIAGNOSIS — Z12.11 SCREENING FOR COLON CANCER: ICD-10-CM

## 2021-02-15 DIAGNOSIS — I10 ESSENTIAL HYPERTENSION, BENIGN: ICD-10-CM

## 2021-02-15 PROCEDURE — 1101F PT FALLS ASSESS-DOCD LE1/YR: CPT | Performed by: FAMILY MEDICINE

## 2021-02-15 PROCEDURE — G8427 DOCREV CUR MEDS BY ELIG CLIN: HCPCS | Performed by: FAMILY MEDICINE

## 2021-02-15 PROCEDURE — G8432 DEP SCR NOT DOC, RNG: HCPCS | Performed by: FAMILY MEDICINE

## 2021-02-15 PROCEDURE — 3017F COLORECTAL CA SCREEN DOC REV: CPT | Performed by: FAMILY MEDICINE

## 2021-02-15 PROCEDURE — G0439 PPPS, SUBSEQ VISIT: HCPCS | Performed by: FAMILY MEDICINE

## 2021-02-15 PROCEDURE — G8536 NO DOC ELDER MAL SCRN: HCPCS | Performed by: FAMILY MEDICINE

## 2021-02-15 PROCEDURE — G8419 CALC BMI OUT NRM PARAM NOF/U: HCPCS | Performed by: FAMILY MEDICINE

## 2021-02-15 PROCEDURE — G0444 DEPRESSION SCREEN ANNUAL: HCPCS | Performed by: FAMILY MEDICINE

## 2021-02-15 PROCEDURE — G8400 PT W/DXA NO RESULTS DOC: HCPCS | Performed by: FAMILY MEDICINE

## 2021-02-15 PROCEDURE — G8756 NO BP MEASURE DOC: HCPCS | Performed by: FAMILY MEDICINE

## 2021-02-15 RX ORDER — FUROSEMIDE 20 MG/1
TABLET ORAL
Qty: 60 TAB | Refills: 2 | Status: SHIPPED | OUTPATIENT
Start: 2021-02-15 | End: 2022-01-28

## 2021-02-15 RX ORDER — POTASSIUM CHLORIDE 750 MG/1
10 TABLET, FILM COATED, EXTENDED RELEASE ORAL DAILY
Qty: 30 TAB | Refills: 2 | Status: SHIPPED | OUTPATIENT
Start: 2021-02-15 | End: 2022-05-06 | Stop reason: SDUPTHER

## 2021-02-15 RX ORDER — SIMVASTATIN 20 MG/1
20 TABLET, FILM COATED ORAL
Qty: 90 TAB | Refills: 1 | Status: SHIPPED | OUTPATIENT
Start: 2021-02-15 | End: 2022-05-06 | Stop reason: SDUPTHER

## 2021-02-15 RX ORDER — LEVOTHYROXINE SODIUM 112 UG/1
TABLET ORAL
Qty: 90 TAB | Refills: 1 | Status: SHIPPED | OUTPATIENT
Start: 2021-02-15 | End: 2022-05-06 | Stop reason: SDUPTHER

## 2021-02-15 RX ORDER — BUSPIRONE HYDROCHLORIDE 5 MG/1
5 TABLET ORAL
Qty: 30 TAB | Refills: 0 | Status: SHIPPED | OUTPATIENT
Start: 2021-02-15 | End: 2022-05-06

## 2021-02-15 NOTE — PROGRESS NOTES
Chief Complaint   Patient presents with   [place] Maintenance reviewed. I have reviewed the patient's medical history in detail and updated the computerized patient record. 1. Have you been to the ER, urgent care clinic since your last visit? Hospitalized since your last visit?no    2. Have you seen or consulted any other health care providers outside of the [address] [place] [address] Michael since your last visit? Include any pap smears or colon screening. No    Patient has not been out of the country in (12 months), NO diarrhea, NO cough, NO chest conjestion, NO temp. Pt has not been around anyone with these symptoms. Encouraged pt to discuss pt's wishes with spouse/partner/family and bring them in the next appt to follow thru with the Advanced Directive    Fall Risk Assessment, last 12 mths 2/15/2021   Able to walk? Yes   Fall in past 12 months? 0   Do you feel unsteady? 0   Are you worried about falling 0       3 most recent PHQ Screens 2/15/2021   Little interest or pleasure in doing things Nearly every day   Feeling down, depressed, irritable, or hopeless Nearly every day   Total Score PHQ 2 6       Abuse Screening Questionnaire 2/15/2021   Do you ever feel afraid of your partner? N   Are you in a relationship with someone who physically or mentally threatens you? N   Is it safe for you to go home?  Y       ADL Assessment 2/15/2021   Feeding yourself No Help Needed   Getting from bed to chair No Help Needed   Getting dressed No Help Needed   Bathing or showering No Help Needed   Walk across the room (includes cane/walker) No Help Needed   Using the telphone No Help Needed   Taking your medications No Help Needed   Preparing meals No Help Needed   Managing money (expenses/bills) No Help Needed   Moderately strenuous housework (laundry) No Help Needed   Shopping for personal items (toiletries/medicines) No Help Needed   Shopping for groceries No Help Needed   Driving No Help Needed   Climbing a flight of stairs No Help Needed   Getting to places beyond walking distances No Help Needed

## 2021-02-15 NOTE — PATIENT INSTRUCTIONS
Medicare Wellness Visit, Female     The best way to live healthy is to have a lifestyle where you eat a well-balanced diet, exercise regularly, limit alcohol use, and quit all forms of tobacco/nicotine, if applicable. Regular preventive services are another way to keep healthy. Preventive services (vaccines, screening tests, monitoring & exams) can help personalize your care plan, which helps you manage your own care. Screening tests can find health problems at the earliest stages, when they are easiest to treat. [facility] follows the current, evidence-based guidelines published by the [facility] Zambrano ([facility]) when recommending preventive services for our patients. Because we follow these guidelines, sometimes recommendations change over time as research supports it. (For example, mammograms used to be recommended annually. Even though Medicare will still pay for an annual mammogram, the newer guidelines recommend a mammogram every two years for women of average risk). Of course, you and your doctor may decide to screen more often for some diseases, based on your risk and your co-morbidities (chronic disease you are already diagnosed with). Preventive services for you include:  - Medicare offers their members a free annual wellness visit, which is time for you and your primary care provider to discuss and plan for your preventive service needs. Take advantage of this benefit every year!  -All adults over the age of 72 should receive the recommended pneumonia vaccines. Current USPSTF guidelines recommend a series of two vaccines for the best pneumonia protection.   -All adults should have a flu vaccine yearly and a tetanus vaccine every 10 years.   -All adults age 48 and older should receive the shingles vaccines (series of two vaccines).       -All adults age 38-68 who are overweight should have a diabetes screening test once every three years.   -All adults born between [birth year] and 1965 should be screened once for Hepatitis C.  -Other screening tests and preventive services for persons with diabetes include: an eye exam to screen for diabetic retinopathy, a kidney function test, a foot exam, and stricter control over your cholesterol.   -Cardiovascular screening for adults with routine risk involves an electrocardiogram (ECG) at intervals determined by your doctor.   -Colorectal cancer screenings should be done for adults age 54-65 with no increased risk factors for colorectal cancer. There are a number of acceptable methods of screening for this type of cancer. Each test has its own benefits and drawbacks. Discuss with your doctor what is most appropriate for you during your annual wellness visit. The different tests include: colonoscopy (considered the best screening method), a fecal occult blood test, a fecal DNA test, and sigmoidoscopy.    -A bone mass density test is recommended when a woman turns 65 to screen for osteoporosis. This test is only recommended one time, as a screening. Some providers will use this same test as a disease monitoring tool if you already have osteoporosis. -Breast cancer screenings are recommended every other year for women of normal risk, age 54-69.  -Cervical cancer screenings for women over age 72 are only recommended with certain risk factors.      Here is a list of your current Health Maintenance items (your personalized list of preventive services) with a due date:  Health Maintenance Due   Topic Date Due    Hepatitis C Test  1950    COVID-19 Vaccine (1 of 2) 03/01/1967    Shingles Vaccine (1 of 2) 03/01/2001    Colorectal Screening  03/01/2001    Mammogram  03/01/2001    Glaucoma Screening   03/01/2016    Bone Mineral Density   03/01/2016    Pneumococcal Vaccine (1 of 1 - PPSV23) 03/01/2016    Cholesterol Test   07/26/2020    Yearly Flu Vaccine (1) 09/01/2020
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
## 2021-06-24 RX ORDER — MECLIZINE HYDROCHLORIDE 25 MG/1
TABLET ORAL
Qty: 90 TABLET | Refills: 0 | Status: SHIPPED | OUTPATIENT
Start: 2021-06-24 | End: 2021-09-26

## 2021-06-28 ENCOUNTER — TELEPHONE (OUTPATIENT)
Dept: INTERNAL MEDICINE CLINIC | Age: 71
End: 2021-06-28

## 2021-06-28 NOTE — TELEPHONE ENCOUNTER
Message fr envera        Reason for call: Pt left a message on last wednesday and has not heard back from anyone yet     Callback required yes/no and why: Yes     Best contact number(s): [phone]

## 2021-07-13 ENCOUNTER — TELEPHONE (OUTPATIENT)
Dept: INTERNAL MEDICINE CLINIC | Age: 71
End: 2021-07-13

## 2021-07-13 NOTE — TELEPHONE ENCOUNTER
Patient would like a call back from nurse please; Alina Aquino.       Call back number; [phone]

## 2021-07-15 ENCOUNTER — TELEPHONE (OUTPATIENT)
Dept: INTERNAL MEDICINE CLINIC | Age: 71
End: 2021-07-15

## 2021-07-15 NOTE — LETTER
7/16/2021 5:29 PM    [facility] [address] [facility]     Dear Ms. Amira Domenico:    Enclosed you will find the form needed to fill out to transfer your records.                 Sincerely,      Madison Bui MD

## 2021-07-15 NOTE — TELEPHONE ENCOUNTER
Message fr bentonera        Caller's first and last name: Jeny Novak, Self     Reason for call:  Wants her records transferred to another facility     Callback required yes/no and why:  Yes     Best contact number(s):  [phone]     Details to clarify the request:  Pt is requesting for someone to mail her the form to have her records from [facility] sent to another facility.  Pt states she doesn't get out much and prefer for the forms she have to fill out be mailed.  Pt is requesting for her records be sent to [facility], Ph [phone], Fax [phone].      Thanks,   Jake Hsieh

## 2021-08-05 RX ORDER — ATENOLOL 25 MG/1
TABLET ORAL
Qty: 90 TABLET | Refills: 0 | Status: SHIPPED | OUTPATIENT
Start: 2021-08-05 | End: 2022-05-06 | Stop reason: SDUPTHER

## 2021-09-26 RX ORDER — MECLIZINE HYDROCHLORIDE 25 MG/1
TABLET ORAL
Qty: 90 TABLET | Refills: 0 | Status: SHIPPED | OUTPATIENT
Start: 2021-09-26 | End: 2021-12-20

## 2021-12-20 RX ORDER — MECLIZINE HYDROCHLORIDE 25 MG/1
TABLET ORAL
Qty: 90 TABLET | Refills: 0 | Status: SHIPPED | OUTPATIENT
Start: 2021-12-20 | End: 2022-03-20

## 2022-03-20 RX ORDER — MECLIZINE HYDROCHLORIDE 25 MG/1
TABLET ORAL
Qty: 90 TABLET | Refills: 0 | Status: SHIPPED | OUTPATIENT
Start: 2022-03-20 | End: 2022-03-25 | Stop reason: SDUPTHER

## 2022-04-07 DIAGNOSIS — R06.00 DYSPNEA, UNSPECIFIED TYPE: ICD-10-CM

## 2022-04-07 RX ORDER — FUROSEMIDE 20 MG/1
TABLET ORAL
Qty: 180 TABLET | OUTPATIENT
Start: 2022-04-07

## 2022-04-12 ENCOUNTER — VIRTUAL VISIT (OUTPATIENT)
Dept: INTERNAL MEDICINE CLINIC | Age: 72
End: 2022-04-12

## 2022-04-12 NOTE — PROGRESS NOTES
Chief Complaint   Patient presents with    Medication Refill     Patient is aware that this is a Virtual Visit or Phone Call Only doctor's visit. Patient has not been out of the country in (14 months), NO diarrhea, NO cough, NO chest conjestion, NO temp. Pt has not been around anyone with these symptoms. Health Maintenance reviewed. I have reviewed the patient's medical history in detail and updated the computerized patient record. 1. Have you been to the ER, urgent care clinic since your last visit? No  Hospitalized since your last visit?  no    2. Have you seen or consulted any other health care providers outside of the [address] [place] [address] since your last visit? no Include any pap smears or colon screening. Encouraged pt to discuss pt's wishes with spouse/partner/family and bring them in the next appt to follow thru with the Advanced Directive      Fall Risk Assessment, last 12 mths 2/15/2021   Able to walk? Yes   Fall in past 12 months? 0   Do you feel unsteady? 0   Are you worried about falling 0       3 most recent PHQ Screens 2/15/2021   Little interest or pleasure in doing things Nearly every day   Feeling down, depressed, irritable, or hopeless Nearly every day   Total Score PHQ 2 6       Abuse Screening Questionnaire 2/15/2021   Do you ever feel afraid of your partner? N   Are you in a relationship with someone who physically or mentally threatens you? N   Is it safe for you to go home?  Y       ADL Assessment 2/15/2021   Feeding yourself No Help Needed   Getting from bed to chair No Help Needed   Getting dressed No Help Needed   Bathing or showering No Help Needed   Walk across the room (includes cane/walker) No Help Needed   Using the telphone No Help Needed   Taking your medications No Help Needed   Preparing meals No Help Needed   Managing money (expenses/bills) No Help Needed   Moderately strenuous housework (laundry) No Help Needed   Shopping for personal items (toiletries/medicines) No Help Needed   Shopping for groceries No Help Needed   Driving No Help Needed   Climbing a flight of stairs No Help Needed   Getting to places beyond walking distances No Help Needed

## 2022-05-05 ENCOUNTER — TELEPHONE (OUTPATIENT)
Dept: INTERNAL MEDICINE CLINIC | Age: 72
End: 2022-05-05

## 2022-05-06 ENCOUNTER — VIRTUAL VISIT (OUTPATIENT)
Dept: INTERNAL MEDICINE CLINIC | Age: 72
End: 2022-05-06
Payer: MEDICARE

## 2022-05-06 DIAGNOSIS — I10 ESSENTIAL HYPERTENSION, BENIGN: ICD-10-CM

## 2022-05-06 DIAGNOSIS — F41.8 SITUATIONAL ANXIETY: ICD-10-CM

## 2022-05-06 DIAGNOSIS — R06.00 DYSPNEA, UNSPECIFIED TYPE: ICD-10-CM

## 2022-05-06 DIAGNOSIS — F43.22 ADJUSTMENT DISORDER WITH ANXIOUS MOOD: Primary | ICD-10-CM

## 2022-05-06 DIAGNOSIS — E78.2 MIXED HYPERLIPIDEMIA: ICD-10-CM

## 2022-05-06 DIAGNOSIS — F40.00 AGORAPHOBIA: ICD-10-CM

## 2022-05-06 DIAGNOSIS — E03.9 HYPOTHYROIDISM, UNSPECIFIED TYPE: ICD-10-CM

## 2022-05-06 PROCEDURE — G8432 DEP SCR NOT DOC, RNG: HCPCS | Performed by: FAMILY MEDICINE

## 2022-05-06 PROCEDURE — G8427 DOCREV CUR MEDS BY ELIG CLIN: HCPCS | Performed by: FAMILY MEDICINE

## 2022-05-06 PROCEDURE — 1090F PRES/ABSN URINE INCON ASSESS: CPT | Performed by: FAMILY MEDICINE

## 2022-05-06 PROCEDURE — 1101F PT FALLS ASSESS-DOCD LE1/YR: CPT | Performed by: FAMILY MEDICINE

## 2022-05-06 PROCEDURE — G8421 BMI NOT CALCULATED: HCPCS | Performed by: FAMILY MEDICINE

## 2022-05-06 PROCEDURE — G8756 NO BP MEASURE DOC: HCPCS | Performed by: FAMILY MEDICINE

## 2022-05-06 PROCEDURE — G8400 PT W/DXA NO RESULTS DOC: HCPCS | Performed by: FAMILY MEDICINE

## 2022-05-06 PROCEDURE — G8536 NO DOC ELDER MAL SCRN: HCPCS | Performed by: FAMILY MEDICINE

## 2022-05-06 PROCEDURE — 3017F COLORECTAL CA SCREEN DOC REV: CPT | Performed by: FAMILY MEDICINE

## 2022-05-06 PROCEDURE — 99214 OFFICE O/P EST MOD 30 MIN: CPT | Performed by: FAMILY MEDICINE

## 2022-05-06 RX ORDER — BUSPIRONE HYDROCHLORIDE 5 MG/1
5 TABLET ORAL
Qty: 30 TABLET | Refills: 0 | Status: SHIPPED | OUTPATIENT
Start: 2022-05-06 | End: 2022-05-06 | Stop reason: SDUPTHER

## 2022-05-06 RX ORDER — ATENOLOL 25 MG/1
25 TABLET ORAL DAILY
Qty: 90 TABLET | Refills: 1 | Status: SHIPPED | OUTPATIENT
Start: 2022-05-06 | End: 2022-09-01 | Stop reason: SDUPTHER

## 2022-05-06 RX ORDER — FUROSEMIDE 20 MG/1
20 TABLET ORAL DAILY
Qty: 90 TABLET | Refills: 1 | Status: SHIPPED | OUTPATIENT
Start: 2022-05-06 | End: 2022-09-01 | Stop reason: SDUPTHER

## 2022-05-06 RX ORDER — LEVOTHYROXINE SODIUM 112 UG/1
TABLET ORAL
Qty: 90 TABLET | Refills: 1 | Status: SHIPPED | OUTPATIENT
Start: 2022-05-06 | End: 2022-09-01 | Stop reason: SDUPTHER

## 2022-05-06 RX ORDER — SIMVASTATIN 20 MG/1
20 TABLET, FILM COATED ORAL
Qty: 90 TABLET | Refills: 1 | Status: SHIPPED | OUTPATIENT
Start: 2022-05-06 | End: 2022-09-01 | Stop reason: SDUPTHER

## 2022-05-06 RX ORDER — BUSPIRONE HYDROCHLORIDE 5 MG/1
5 TABLET ORAL
Qty: 90 TABLET | Refills: 1 | Status: SHIPPED | OUTPATIENT
Start: 2022-05-06 | End: 2022-09-01 | Stop reason: SDUPTHER

## 2022-05-06 RX ORDER — POTASSIUM CHLORIDE 750 MG/1
10 TABLET, FILM COATED, EXTENDED RELEASE ORAL DAILY
Qty: 90 TABLET | Refills: 1 | Status: SHIPPED | OUTPATIENT
Start: 2022-05-06 | End: 2022-09-01 | Stop reason: SDUPTHER

## 2022-05-06 NOTE — TELEPHONE ENCOUNTER
----- Message from Lida Thomason sent at 5/5/2022  1:04 PM EDT -----  Subject: Message to Provider    QUESTIONS  Information for Provider? Pt calling about situation at home and needs RX   for anxiety if possible. Drug store that is used it the [address] Please call with information.  ---------------------------------------------------------------------------  --------------  CALL BACK INFO  What is the best way for the office to contact you? OK to leave message on   voicemail  Preferred Call Back Phone Number?  [phone]  ---------------------------------------------------------------------------  --------------  SCRIPT ANSWERS  undefined

## 2022-05-06 NOTE — PROGRESS NOTES
Chief Complaint   Patient presents with    Anxiety     Patient has not been out of the country in (14 months), NO diarrhea, NO cough, NO chest conjestion, NO temp. Pt has not been around anyone with these symptoms. Health Maintenance reviewed. I have reviewed the patient's medical history in detail and updated the computerized patient record. 1. Have you been to the ER, urgent care clinic since your last visit? no   Hospitalized since your last visit?  no    2. Have you seen or consulted any other health care providers outside of the [address] [place] [address] since your last visit? no Include any pap smears or colon screening. Encouraged pt to discuss pt's wishes with spouse/partner/family and bring them in the next appt to follow thru with the Advanced Directive    @  [address], last 12 mths 2/15/2021   Able to walk? Yes   Fall in past 12 months? 0   Do you feel unsteady? 0   Are you worried about falling 0       3 most recent PHQ Screens 2/15/2021   Little interest or pleasure in doing things Nearly every day   Feeling down, depressed, irritable, or hopeless Nearly every day   Total Score PHQ 2 6       Abuse Screening Questionnaire 2/15/2021   Do you ever feel afraid of your partner? N   Are you in a relationship with someone who physically or mentally threatens you? N   Is it safe for you to go home?  Y       ADL Assessment 2/15/2021   Feeding yourself No Help Needed   Getting from bed to chair No Help Needed   Getting dressed No Help Needed   Bathing or showering No Help Needed   Walk across the room (includes cane/walker) No Help Needed   Using the telphone No Help Needed   Taking your medications No Help Needed   Preparing meals No Help Needed   Managing money (expenses/bills) No Help Needed   Moderately strenuous housework (laundry) No Help Needed   Shopping for personal items (toiletries/medicines) No Help Needed   Shopping for groceries No Help Needed   Driving No Help Needed Climbing a flight of stairs No Help Needed   Getting to places beyond walking distances No Help Needed

## 2022-05-06 NOTE — PROGRESS NOTES
Subjective: Mercedez Ruiz is a 71 y.o. female who presents for follow up of hypertension and hyperlipidemia. New concerns: anxious, Bette Rodeny now in NH facility and she's adjustment to that. Feels weak and dyspnic and c/o swelling. Anxiety levels have reached a point where does not want to go out at all and just wants to stay at home. Asks for something for anxiety. Previous roommate on benzodiazepine and wants something similar. Diet and Lifestyle: nonsmoker    Cardiovascular ROS:   Reports taking blood pressure medications without side affects. Review of Systems, additional:  Pertinent items are noted in HPI. Patient Active Problem List    Diagnosis Date Noted    Hyperlipidemia 12/15/2014    Aortic stenosis 07/05/2014    Hypothyroidism 12/06/2012    Essential hypertension, benign 11/01/2012    Anxiety 09/01/2011     Current Outpatient Medications   Medication Sig Dispense Refill    furosemide (LASIX) 20 mg tablet TAKE 1 TO 2 TABLETS BY MOUTH DAILY AS NEEDED FOR SWELLING OR SHORTNESS OF BREATH 90 Tablet 0    meclizine (ANTIVERT) 25 mg tablet TAKE 1 TABLET BY MOUTH THREE TIMES DAILY AS NEEDED FOR DIZZINESS 90 Tablet 1    atenoloL (TENORMIN) 25 mg tablet Take 1 tablet by mouth once daily 90 Tablet 0    potassium chloride SR (KLOR-CON 10) 10 mEq tablet Take 1 Tab by mouth daily. 30 Tab 2    levothyroxine (SYNTHROID) 112 mcg tablet TAKE 1 TABLET BY MOUTH ONCE DAILY 90 Tab 1    simvastatin (ZOCOR) 20 mg tablet Take 1 Tab by mouth nightly. 90 Tab 1    aspirin delayed-release 81 mg tablet Take 1 Tab by mouth daily.  90 Tab 3     Allergies   Allergen Reactions    Darvocet A500 [Propoxyphene N-Acetaminophen] Rash     Past Medical History:   Diagnosis Date    Anxiety     Depression     Hypertension     Panic attack     Thyroid cancer ([facility] [address]      Social History     Tobacco Use    Smoking status: Never Smoker    Smokeless tobacco: Never Used   Substance Use Topics    Alcohol use: No                 Objective:     Physical exam significant for the following:     Sounds NAD  There were no vitals taken for this visit. female NAD      . Assessment/Plan:     hypertension control uncertain. ICD-10-CM ICD-9-CM    1. Adjustment disorder with anxious mood  F43.22 309.24    2. Dyspnea, unspecified type  R06.00 786.09    3. Situational anxiety  F41.8 300.09    4. Essential hypertension, benign  I10 401.1    5. Hypothyroidism, unspecified type  E03.9 244.9    6. Mixed hyperlipidemia  E78.2 272.2        Orders Placed This Encounter    LIPID PANEL     Standing Status:   Future     Standing Expiration Date:   70/0/9921    METABOLIC PANEL, COMPREHENSIVE     Standing Status:   Future     Standing Expiration Date:   11/6/2022    TSH 3RD GENERATION     Standing Status:   Future     Standing Expiration Date:   11/5/2022    atenoloL (TENORMIN) 25 mg tablet     Sig: Take 1 Tablet by mouth daily. Dispense:  90 Tablet     Refill:  1    busPIRone (BUSPAR) 5 mg tablet     Sig: Take 1 Tablet by mouth three (3) times daily (with meals). As needed     Dispense:  30 Tablet     Refill:  0    furosemide (LASIX) 20 mg tablet     Sig: Take 1 Tablet by mouth daily. Dispense:  90 Tablet     Refill:  1    levothyroxine (SYNTHROID) 112 mcg tablet     Sig: TAKE 1 TABLET BY MOUTH ONCE DAILY     Dispense:  90 Tablet     Refill:  1    potassium chloride SR (KLOR-CON 10) 10 mEq tablet     Sig: Take 1 Tablet by mouth daily. Dispense:  90 Tablet     Refill:  1    simvastatin (ZOCOR) 20 mg tablet     Sig: Take 1 Tablet by mouth nightly. Dispense:  90 Tablet     Refill:  1          iPlar Burgess is a 71 y.o. female who was phone evaluated on 5/6/2022. Consent:  She and/or her healthcare decision maker is aware that this patient-initiated Telehealth encounter is a billable service, with coverage as determined by her insurance carrier.  She is aware that she may receive a bill and has provided verbal consent to proceed: Yes    I was in the office while conducting this encounter. Assessment & Plan:   Diagnoses and all orders for this visit:    1. Adjustment disorder with anxious mood    2. Dyspnea, unspecified type  -     furosemide (LASIX) 20 mg tablet; Take 1 Tablet by mouth daily. -     potassium chloride SR (KLOR-CON 10) 10 mEq tablet; Take 1 Tablet by mouth daily. 3. Situational anxiety  -     busPIRone (BUSPAR) 5 mg tablet; Take 1 Tablet by mouth three (3) times daily (with meals). As needed    4. Essential hypertension, benign  -     atenoloL (TENORMIN) 25 mg tablet; Take 1 Tablet by mouth daily.  -     METABOLIC PANEL, COMPREHENSIVE; Future    5. Hypothyroidism, unspecified type  -     levothyroxine (SYNTHROID) 112 mcg tablet; TAKE 1 TABLET BY MOUTH ONCE DAILY  -     TSH 3RD GENERATION; Future    6. Mixed hyperlipidemia  -     simvastatin (ZOCOR) 20 mg tablet; Take 1 Tablet by mouth nightly. -     LIPID PANEL; Future      Referral to case management. Has transportation issues and complains of some cognitive complaints and is thinking about may be entering an assisted living place herself. She has been pushing for her roommate to be admitted to a long-term care facility for a long time and finally its happened but it is going to require an adjustment in her life and she is anxious about that. Benzodiazepines not recommended for older individuals but we can try buspirone. Discussed possible side affects, precautions, and drug interactions and possible benefits of the medication(s).   Consider starting an SSRI       Orders Placed This Encounter    AMB SUPPLY ORDER     Case Management    LIPID PANEL     Standing Status:   Future     Standing Expiration Date:   83/1/9509    METABOLIC PANEL, COMPREHENSIVE     Standing Status:   Future     Standing Expiration Date:   11/6/2022    TSH 3RD GENERATION     Standing Status:   Future     Standing Expiration Date:   11/5/2022    atenoloL (TENORMIN) 25 mg tablet     Sig: Take 1 Tablet by mouth daily. Dispense:  90 Tablet     Refill:  1    busPIRone (BUSPAR) 5 mg tablet     Sig: Take 1 Tablet by mouth three (3) times daily (with meals). As needed     Dispense:  30 Tablet     Refill:  0    furosemide (LASIX) 20 mg tablet     Sig: Take 1 Tablet by mouth daily. Dispense:  90 Tablet     Refill:  1    levothyroxine (SYNTHROID) 112 mcg tablet     Sig: TAKE 1 TABLET BY MOUTH ONCE DAILY     Dispense:  90 Tablet     Refill:  1    potassium chloride SR (KLOR-CON 10) 10 mEq tablet     Sig: Take 1 Tablet by mouth daily. Dispense:  90 Tablet     Refill:  1    simvastatin (ZOCOR) 20 mg tablet     Sig: Take 1 Tablet by mouth nightly. Dispense:  90 Tablet     Refill:  1       712  Subjective:        > 30 min    We discussed the expected course, resolution and complications of the diagnosis(es) in detail. Medication risks, benefits, costs, interactions, and alternatives were discussed as indicated. I advised her to contact the office if her condition worsens, changes or fails to improve as anticipated. She expressed understanding with the diagnosis(es) and plan. Pursuant to the emergency declaration under the [facility], [facility]5 waiver authority and the [employer] General Act, this Virtual  Visit was conducted, with patient's consent, to reduce the patient's risk of exposure to COVID-19 and provide continuity of care for an established patient. Services were provided through a video synchronous discussion virtually to substitute for in-person clinic visit.     Pia Rosario MD

## 2022-05-06 NOTE — TELEPHONE ENCOUNTER
Patient had virtual appt with Dr Shawanda king  [facility], ALIZA  4/3/3392  4:52 PM

## 2022-05-10 ENCOUNTER — PATIENT OUTREACH (OUTPATIENT)
Dept: CASE MANAGEMENT | Age: 72
End: 2022-05-10

## 2022-05-10 NOTE — PROGRESS NOTES
Ambulatory Care Management Note    Date/Time:  5/10/2022 10:45 AM    This patient was received as a referral from Provider. Ambulatory Care Manager outreached to patient today to offer care management services. Introduction to self and role of care manager provided. Pt reports she can drive but doesn't have a vehicle currently. She has a young lady delivering her groceries qwk and her Rx's are also delivered by a local pharmacy. Ultimately, pt has no transportation to appt's and she would like an aide in the home to assist w/meals, light house work and light px assist w/dressing & bathing d/t her chronic dyspnea (pt sts she gets \"winded\" very easily, doesn't have much stamina). Pt does not have a pulse oximeter to monitor her oxygen saturation. [facility] strongly encouraged pt to purchase one on [address] where she'll find it low cost and it can be sent to her (pt doesn't like to leave the house). It was explained to her that if she had low O2 levels, she would know and could contact her PCP when it decreases to certain percentages and it would give Dr Michael Ward a measurable way to know if she is indeed hypoxic. Pt voiced understanding. Then AC informed pt that information will be sent to her PO Box on file regarding this conversation and it will provide resources and contacts regarding transportation, housing (pt spoke to PCP about a poss move to [facility]), food (Meals on Wheels) and the [employer]. Patient accepted care management services at this time. Follow up call was scheduled.   Patient has Ambulatory Care Manager's contact number for any questions or concerns.    Yair Jimenez

## 2022-05-10 NOTE — LETTER
5/10/2022 10:46 AM    Ms. Parks [address]  [address]      Ms. Amira Acuña,     It was very nice speaking with you this morning. Enclosed you will find information on Advance Care Planning and Tip Sheets on Transportation, Housing, Food and Financial Resources. I've also included a PHI Release of Information Form for you to update to include your daughter Johana Kyle. In particular, I would like you to contact [address] [place] [address] at [phone]. This is a non-profit organization that provides programs and services, such as transportation, housing and personal health aides, in your area. You should also contact [facility] (Medicaid) at [phone] and they will arrange transport for your Medicaid appointments, you just need to call and schedule. I will call you at a later date to make sure you received this mailing and address any questions or concerns you might have.         Sincerely,      Kristian Jiménez RN  Ambulatory - [phone]

## 2022-05-17 ENCOUNTER — PATIENT OUTREACH (OUTPATIENT)
Dept: CASE MANAGEMENT | Age: 72
End: 2022-05-17

## 2022-05-23 ENCOUNTER — PATIENT OUTREACH (OUTPATIENT)
Dept: CASE MANAGEMENT | Age: 72
End: 2022-05-23

## 2022-05-23 NOTE — PROGRESS NOTES
Initial Contact Social Work Note - Ambulatory  5/23/2022    Date of referral: 05/23/22  Referral received from: Stoney Tariq RN   Reason for referral: Resources/Support at home    Previous SW Referral: No    If yes, brief summary and outcome:  N/A    Two Identifiers Verified: Yes    Insurance Provider: Medicare, [address]: Adult Child/Children and Neighbors Patient has three remaining children - a daughter in [place] Sergio), son in [place] who drives [facility]), and a son in [facility]). Her main contact is with Mary Rodriguez.  Status: N/A    Community Providers: None     ADL Assistance: Bathing and Dressing Patient owns a walker, and uses this to ambulate. She struggles with bathing, cutting her nails, cooking, cleaning, and laundry. She has panic attacks, and does not leave her home. She also struggles with back pain, and shortness of breath. She has never completed a walking test for oxygen, but knows that this is the next step towards getting home oxygen. Housing/Living Concerns or Home Modification Needs: The patient lives alone, with her 11year old Shiatzu dog, Malena Valera. Her ex-, Dmitri Reina, was living with her until recently. He has dementia, and recently signed himself in to a nursing facility. The patient reports that he is doing well in taking care of himself, and is considering returning home with the patient. Transportation Concern: The patient owns a car, but gave it to her daughter in [place]. The patient explained that she feared that her ex-, Anthony Thurman, would take the keys and drive. The patient does not have any transportation, and has anxiety related to leaving her house. She suffers from panic attacks. Medication Cost Concern: Patient gets her medications delivered from [address] [employer]. Medication Education or Adherence Concern: No concerns     Financial Concern(s):  The patient reports low income, and very minimal savings (under $2,000). Income (only if applicable): The patient receives $861/month. Her daughter helps her pay her bills, and assists her with rent/utilities. Ability to Read/Write: Yes. 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 Advance Care Plan:  Not on file; education provided  Patient verbalized understanding of the option to complete an AMD when she is ready. Cheli Stewart RN, previously provided information on this topic. Other:   Received referral from colleague Cheli Stewart RN, for assistance with resources. Chart reviewed, and called the patient at K#[phone]. Introduced self, social work care management services, and purpose of the call. The patient reports severe anxiety, and panic attacks. She shared that she is struggling with grief related to her daughter's passing four years ago. She shared that her daughter, who has been living in [place],  of suicide. The patient explained that any time she leaves her house, someone asks her about it, or makes comments. She prefers to stay at home. She hopes to be able to continue phone visits with her PCP. The patient became acquainted with a daughter of an old friend, Joselito Canchola, who is a teacher in the area. Ifeoma Crockett gets the patient's groceries once a week, and checks in on her via text/phone. Patient agreed to start thinking about asking her friend, Joselito Canchola, if she could attend an in-person MD visit with the patient (and drive her to the appointment), in order to complete an oxygen test.  The patient is not sure if she is willing to leave the house yet, however. The patient verbalized understanding that she can contact [employer] [place],  worker, to have a UAI assessment completed, in order to start the process for getting a Medicaid personal care aid. She will start thinking about calling [employer], but does not feel ready yet to have someone come into her home. Patient also has this 's direct contact information, if she has any questions/concerns. Identified Needs:      Patient needs an oxygen walking test   Can complete a Medicaid UAI when she is ready, in order to receive personal care aid services    Isolation/Grief    Social Work Plan:     Please see goals for plan. Method of Communication with Provider (if appropriate): staff message     Goals Addressed                    This Visit's Progress      Connect patient with appropriate resources for support in the home. (pt-stated)         05/23/22  Patient needs an oxygen walking test, a Medicaid UAI/personal care aid arrangements, and psychosocial support related to her grief process. Plan: This  will follow up with her in 2 weeks to provide emotional support, and to ask about the patient's progress in thinking about next steps towards a doctor's visit, and towards getting a Medicaid UAI assessment completed.     JESUS Robles/DEEP, [facility]   C#[phone]              JESUS Robles/DEEP, [facility]   P#[phone]

## 2022-06-01 ENCOUNTER — PATIENT OUTREACH (OUTPATIENT)
Dept: CASE MANAGEMENT | Age: 72
End: 2022-06-01

## 2022-06-01 NOTE — PROGRESS NOTES
Ambulatory Care Management Note    Date/Time:  6/1/2022 12:35 PM    ACM called to review/update the following screenings during this call; general assessment, disease specific assessment , self management assessment, SDOH assessments, ACP assessment and note and medication reconciliation , but pt didn't answer the phone. However, ACM did leave a VM letting her know that Dr Wendy Antonio is requesting an in-person visit from her re: current condition. A message regarding this request was also routed to  (Renay Duff) as an update of this situation.    AC will outreach to pt again at a later date.  /dla

## 2022-06-06 ENCOUNTER — PATIENT OUTREACH (OUTPATIENT)
Dept: CASE MANAGEMENT | Age: 72
End: 2022-06-06

## 2022-06-06 NOTE — PROGRESS NOTES
Social Work Ambulatory Care   Follow UpNote  6/6/2022    Goals Addressed                    This Visit's Progress      Connect patient with appropriate resources for support in the home. (pt-stated)   No change      06/06/22  Voicemail message left for the patient today, to offer continued support and resource referral.  Awaiting a call back. Plan: This  will follow up with the patient this week, to provide emotional support, and to ask about the patient's progress in thinking about next steps towards a doctor's visit, and towards getting a Medicaid UAI assessment completed. Aris Meigs, MSW/DEEP, [facility]   S#[phone]    05/23/22  Patient needs an oxygen walking test, a Medicaid UAI/personal care aid arrangements, and psychosocial support related to her grief process. Plan: This  will follow up with her in 2 weeks to provide emotional support, and to ask about the patient's progress in thinking about next steps towards a doctor's visit, and towards getting a Medicaid UAI assessment completed.     Aris Meigs, MSW/DEEP, [facility]   I#[phone]              Aris Meigs, MSW/DEEP, [facility]   W#[phone]

## 2022-06-07 ENCOUNTER — PATIENT OUTREACH (OUTPATIENT)
Dept: CASE MANAGEMENT | Age: 72
End: 2022-06-07

## 2022-06-07 NOTE — LETTER
Detail Level: Zone June 8, 2022    Ms. Parks [address]  [address]    My name is Taylor Rose. I am a  with [address]. I often work with patients who could benefit from additional resources in the community. We are committed to providing you excellent care. I connected with you over the phone, on May 23, 2022, but have been unable to reach you since that time, to follow up on resources discussed, and to offer continued support. Please contact me at P#[phone] when you receive this letter. We appreciate the confidence you've shown by selecting us to provide your healthcare needs and I look forward to hearing from you soon.              Sincerely,      Mio Collazo LCSW Detail Level: Detailed

## 2022-06-08 NOTE — PROGRESS NOTES
Ambulatory Care Social Work   Follow Up Note  6/8/2022    Please see initial assessment completed by this  on 5/23/2022. The patient has been struggling with back pain, and shortness of breath, but is hesitant to attend an in-person visit with her PCP for various reasons. As noted by Radha Garcia RN/ACM, Dr. Mary Lowe is requesting an in-person visit from her re: current condition. This  has been unable to contact the patient since our initial conversation on 5/23/2022. A letter was sent to the patient today, with this 's contact information, requesting a call back. Goals Addressed                    This Visit's Progress      Connect patient with appropriate resources for support in the home. (pt-stated)   No change      06/08/22  Voicemail messages left for the patient each day this week, to offer continued support and resource referral.  Awaiting a call back. Plan: This  will send a letter to the patient, in hopes of connecting with her. JESUS Madera/DEEP, [facility]   [phone]    06/06/22  Voicemail message left for the patient today, to offer continued support and resource referral.  Awaiting a call back. Plan: This  will follow up with the patient this week, to provide emotional support, and to ask about the patient's progress in thinking about next steps towards a doctor's visit, and towards getting a Medicaid UAI assessment completed. JESUS Madera/DEEP, [facility]   [phone]    05/23/22  Patient needs an oxygen walking test, a Medicaid UAI/personal care aid arrangements, and psychosocial support related to her grief process. Plan:  This  will follow up with her in 2 weeks to provide emotional support, and to ask about the patient's progress in thinking about next steps towards a doctor's visit, and towards getting a Medicaid UAI assessment completed.     JESUS Camacho/DEEP, [facility]   I#[phone]              JESUS Camacho/DEEP, [facility]   T#[phone]

## 2022-06-20 ENCOUNTER — PATIENT OUTREACH (OUTPATIENT)
Dept: CASE MANAGEMENT | Age: 72
End: 2022-06-20

## 2022-06-20 NOTE — LETTER
June 21, 2022     Ms[address]  [address]      My name is Emily Sterling. I am a  with [address]. I often work with patients who could benefit from additional resources in the community. We are committed to providing you excellent care. I connected with you over the phone, on May 23, 2022, but have been unable to reach you since that time, to follow up on resources discussed, and to offer continued support. Please contact me at [phone], if you would like additional help with community resources.  We appreciate the confidence you've shown by selecting us to provide your healthcare needs and I look forward to hearing from you soon.              Sincerely,      Ofe Tran LCSW

## 2022-06-21 ENCOUNTER — PATIENT OUTREACH (OUTPATIENT)
Dept: CASE MANAGEMENT | Age: 72
End: 2022-06-21

## 2022-06-21 NOTE — PROGRESS NOTES
Ambulatory Care   Social Work Note    Patient has graduated from the Complex Case Management  program on 06/21/22. This  has been unable to contact the patient since the initial contact on 5/23/2022. No further Social Work follow-up scheduled. Patient has Social Work contact information for further questions, concerns, or needs. Goals Addressed                    This Visit's Progress      COMPLETED: Connect patient with appropriate resources for support in the home. (pt-stated)   No change      06/21/22  Voicemail message left for the patient yesterday, 6/20/2022, requesting a call back. Mailed a letter today, providing this 's contact information, and offering support as needed/desired by patient. No further social work follow up is planned. TROY Jessica, [facility]   Y#[phone]    06/08/22  Voicemail messages left for the patient each day this week, to offer continued support and resource referral.  Awaiting a call back. Plan: This  will send a letter to the patient, in hopes of connecting with her. TROY Jessica [facility]   Y#[phone]    06/06/22  Voicemail message left for the patient today, to offer continued support and resource referral.  Awaiting a call back. Plan: This  will follow up with the patient this week, to provide emotional support, and to ask about the patient's progress in thinking about next steps towards a doctor's visit, and towards getting a Medicaid UAI assessment completed. TROY Jessica, [facility]   H#[phone]    05/23/22  Patient needs an oxygen walking test, a Medicaid UAI/personal care aid arrangements, and psychosocial support related to her grief process. Plan:  This  will follow up with her in 2 weeks to provide emotional support, and to ask about the patient's progress in thinking about next steps towards a doctor's visit, and towards getting a Medicaid UAI assessment completed. JESUS Lyn/DEEP, [facility]   V#[phone]              Patient's upcoming visits:  No future appointments.      JESUS Lyn/DEEP, [facility]   [phone]

## 2022-06-27 ENCOUNTER — PATIENT OUTREACH (OUTPATIENT)
Dept: CASE MANAGEMENT | Age: 72
End: 2022-06-27

## 2022-06-27 NOTE — PROGRESS NOTES
Ambulatory Care Management Note        Date/Time:  6/27/2022 8:42 AM    This patient was received as a referral from  Provider for case management services. Multiple unsuccessful attempts have been made to contact this patient. Ambulatory Care Management get-in-touch letter will be mailed to the patient's address on file (not able to send GIT letter via [employer] message as pt doesn't currently have active status) at this time.    ACM will monitor for a response from this pt over the next 2wks.   /elena

## 2022-06-27 NOTE — LETTER
6/27/2022 8:47 AM    Ms[address] [facility] [address]        Mrs. Salvador Humphrey,        My name is Margoth Gross. I am a Care Manager with [address] [place] [address]. I often work with patients who could benefit from additional support understanding and managing their health. We are committed to providing you excellent care. I have been unable to reach you on this at [phone] . Please contact me at [phone] if you would like additional help with community resources. We appreciate the confidence you've shown by selecting us to provide your healthcare needs and I look forward to hearing from you soon.            [facility],      Margoth Gross, BSN, RN    Ambulatory

## 2022-07-11 ENCOUNTER — PATIENT OUTREACH (OUTPATIENT)
Dept: CASE MANAGEMENT | Age: 72
End: 2022-07-11

## 2022-07-11 NOTE — PROGRESS NOTES
Ambulatory Care Management Note        Date/Time:  7/11/2022 9:18 AM    This patient was received as a referral from  Provider for case management services. Multiple unsuccessful attempts have been made to contact patient. Ambulatory Care Management get in touch letter was mailed to the patient's address on file w/no response to date . Complex case mgmt.  episode now resolved and no further outreach attempts are scheduled by AC at this time.    Colby Ordaz

## 2022-08-03 ENCOUNTER — PATIENT OUTREACH (OUTPATIENT)
Dept: CASE MANAGEMENT | Age: 72
End: 2022-08-03

## 2022-08-03 NOTE — PROGRESS NOTES
Ambulatory Care Social Work Note   8/3/2022    Patient called and left a voicemail message to request information for mobile podiatry services. She explained that she is unable to care for her toenails. She stated \"I do not leave my house, and I do not like to talk on the phone. \"  The patient made it known that she will only communicate via voicemail, and text messaging. This  left a voicemail message with the following information --     [address] [facility], X#[phone]. Explained that they may not provide services in her area, but might be able to offer suggestions of providers in her area. 2.  Podiatrists located in [place], [address], DPM and [place] Ernst DPM, [phone] [place] O#[phone]. Explained        that they may only offer in office podiatry services, but she can always call to request a home visit. Explained via voicemail that this  would need for the patient to communicate via phone in order to provide complex case management services in the future. Offered support, as desired by the patient.       Jeff Armstrong, JESUS/DEEP, [facility]   F#[phone]

## 2022-09-01 ENCOUNTER — VIRTUAL VISIT (OUTPATIENT)
Dept: INTERNAL MEDICINE CLINIC | Age: 72
End: 2022-09-01
Payer: MEDICARE

## 2022-09-01 DIAGNOSIS — F43.21 MOURNING: ICD-10-CM

## 2022-09-01 DIAGNOSIS — R53.1 WEAKNESS: ICD-10-CM

## 2022-09-01 DIAGNOSIS — E78.2 MIXED HYPERLIPIDEMIA: ICD-10-CM

## 2022-09-01 DIAGNOSIS — Z12.11 SCREENING FOR COLON CANCER: ICD-10-CM

## 2022-09-01 DIAGNOSIS — R06.09 DYSPNEA ON EXERTION: ICD-10-CM

## 2022-09-01 DIAGNOSIS — F40.00 AGORAPHOBIA: ICD-10-CM

## 2022-09-01 DIAGNOSIS — F33.1 MODERATE EPISODE OF RECURRENT MAJOR DEPRESSIVE DISORDER (HCC): Primary | ICD-10-CM

## 2022-09-01 DIAGNOSIS — Z13.220 SCREENING CHOLESTEROL LEVEL: ICD-10-CM

## 2022-09-01 DIAGNOSIS — B35.1 TINEA UNGUIUM: ICD-10-CM

## 2022-09-01 DIAGNOSIS — Z11.59 ENCOUNTER FOR HEPATITIS C SCREENING TEST FOR LOW RISK PATIENT: ICD-10-CM

## 2022-09-01 DIAGNOSIS — Z13.31 POSITIVE DEPRESSION SCREENING: ICD-10-CM

## 2022-09-01 DIAGNOSIS — I10 ESSENTIAL HYPERTENSION, BENIGN: ICD-10-CM

## 2022-09-01 DIAGNOSIS — R06.00 DYSPNEA, UNSPECIFIED TYPE: ICD-10-CM

## 2022-09-01 DIAGNOSIS — E03.9 HYPOTHYROIDISM, UNSPECIFIED TYPE: ICD-10-CM

## 2022-09-01 DIAGNOSIS — F41.8 SITUATIONAL ANXIETY: ICD-10-CM

## 2022-09-01 PROCEDURE — 1101F PT FALLS ASSESS-DOCD LE1/YR: CPT | Performed by: FAMILY MEDICINE

## 2022-09-01 PROCEDURE — G8511 SCR DEP POS, NO PLAN DOC RNG: HCPCS | Performed by: FAMILY MEDICINE

## 2022-09-01 PROCEDURE — G8421 BMI NOT CALCULATED: HCPCS | Performed by: FAMILY MEDICINE

## 2022-09-01 PROCEDURE — 3017F COLORECTAL CA SCREEN DOC REV: CPT | Performed by: FAMILY MEDICINE

## 2022-09-01 PROCEDURE — G8400 PT W/DXA NO RESULTS DOC: HCPCS | Performed by: FAMILY MEDICINE

## 2022-09-01 PROCEDURE — 1090F PRES/ABSN URINE INCON ASSESS: CPT | Performed by: FAMILY MEDICINE

## 2022-09-01 PROCEDURE — G8427 DOCREV CUR MEDS BY ELIG CLIN: HCPCS | Performed by: FAMILY MEDICINE

## 2022-09-01 PROCEDURE — 99215 OFFICE O/P EST HI 40 MIN: CPT | Performed by: FAMILY MEDICINE

## 2022-09-01 PROCEDURE — G8536 NO DOC ELDER MAL SCRN: HCPCS | Performed by: FAMILY MEDICINE

## 2022-09-01 PROCEDURE — G8756 NO BP MEASURE DOC: HCPCS | Performed by: FAMILY MEDICINE

## 2022-09-01 RX ORDER — POTASSIUM CHLORIDE 750 MG/1
10 TABLET, FILM COATED, EXTENDED RELEASE ORAL DAILY
Qty: 90 TABLET | Refills: 1 | Status: SHIPPED | OUTPATIENT
Start: 2022-09-01

## 2022-09-01 RX ORDER — CITALOPRAM 10 MG/1
10 TABLET ORAL DAILY
Qty: 30 TABLET | Refills: 5 | Status: SHIPPED | OUTPATIENT
Start: 2022-09-01

## 2022-09-01 RX ORDER — BUSPIRONE HYDROCHLORIDE 5 MG/1
5 TABLET ORAL
Qty: 90 TABLET | Refills: 1 | Status: SHIPPED | OUTPATIENT
Start: 2022-09-01

## 2022-09-01 RX ORDER — SIMVASTATIN 20 MG/1
20 TABLET, FILM COATED ORAL
Qty: 90 TABLET | Refills: 1 | Status: SHIPPED | OUTPATIENT
Start: 2022-09-01

## 2022-09-01 RX ORDER — ATENOLOL 25 MG/1
25 TABLET ORAL DAILY
Qty: 90 TABLET | Refills: 1 | Status: SHIPPED | OUTPATIENT
Start: 2022-09-01

## 2022-09-01 RX ORDER — FUROSEMIDE 20 MG/1
20 TABLET ORAL DAILY
Qty: 90 TABLET | Refills: 1 | Status: SHIPPED | OUTPATIENT
Start: 2022-09-01

## 2022-09-01 RX ORDER — LEVOTHYROXINE SODIUM 112 UG/1
TABLET ORAL
Qty: 90 TABLET | Refills: 1 | Status: SHIPPED | OUTPATIENT
Start: 2022-09-01

## 2022-09-01 RX ORDER — MECLIZINE HYDROCHLORIDE 25 MG/1
TABLET ORAL
Qty: 90 TABLET | Refills: 1 | Status: SHIPPED | OUTPATIENT
Start: 2022-09-01

## 2022-09-01 NOTE — PROGRESS NOTES
PROGRESS NOTE        SUBJECTIVE:  Diagnosis/Chief Complaint: No chief complaint on file. Doing well with mood no  Symptoms anniversary of daughters death who shot herself  Suicidal: no  Side affects: no  States taking medications per medicine list.yes - as rx  See phq9    Patient Active Problem List    Diagnosis Date Noted    Hyperlipidemia 12/15/2014    Aortic stenosis 07/05/2014    Hypothyroidism 12/06/2012    Essential hypertension, benign 11/01/2012    Anxiety 09/01/2011     Allergies   Allergen Reactions    Darvocet A500 [Propoxyphene N-Acetaminophen] Rash     Past Medical History:   Diagnosis Date    Anxiety     Aortic valve stenosis     Depression     Hyperlipidemia     Hypertension     Panic attack     Thyroid cancer ([facility] [address]      Past Surgical History:   Procedure Laterality Date    HX HYSTERECTOMY      HX ORTHOPAEDIC      left knee cap    HX THYROIDECTOMY  2006    HX TUBAL LIGATION       Family History   Problem Relation Age of Onset    Heart Disease Mother     OSTEOARTHRITIS Mother     Cancer Mother     Cancer Father         stomach    Psychiatric Disorder Sister     OSTEOARTHRITIS Sister     Psychiatric Disorder Daughter      Social History     Tobacco Use    Smoking status: Never    Smokeless tobacco: Never   Substance Use Topics    Alcohol use: No        OBJECTIVE:    . There were no vitals taken for this visit. WDWN in NAD    Reviewed: Medications, allergies, clinical lab test results and imaging results have been reviewed. Any abnormal findings have been addressed. ASSESSMENT:       ICD-10-CM ICD-9-CM    1. Moderate episode of recurrent major depressive disorder (HCC)  F33.1 296.32       2. Essential hypertension, benign  V85 237.8 METABOLIC PANEL, COMPREHENSIVE      CBC W/O DIFF      atenoloL (TENORMIN) 25 mg tablet      3. Dyspnea on exertion  R06.00 786.09       4. Tinea unguium  B35.1 110.1       5. Weakness  R53.1 780.79 AMB SUPPLY ORDER      6. Agoraphobia  F40.00 300.22       7.  Mourning F43.21 309.0       8. Screening cholesterol level  Z13.220 V77.91 LIPID PANEL      9. Hypothyroidism, unspecified type  E03.9 244.9 TSH 3RD GENERATION      levothyroxine (SYNTHROID) 112 mcg tablet      10. Mixed hyperlipidemia  E78.2 272.2 simvastatin (ZOCOR) 20 mg tablet      11. Screening for colon cancer  Z12.11 V76.51 OCCULT BLOOD IMMUNOASSAY,DIAGNOSTIC      OCCULT BLOOD IMMUNOASSAY,DIAGNOSTIC      12. Encounter for hepatitis C screening test for low risk patient  Z11.59 V73.89 HEP B SURFACE AG      RPR      HEPATITIS C AB, RFLX TO QT BY PCR      HIV 1/2 AG/AB, 4TH GENERATION,W RFLX CONFIRM      13. Dyspnea, unspecified type  R06.00 786.09 furosemide (LASIX) 20 mg tablet      potassium chloride SR (KLOR-CON 10) 10 mEq tablet      14. Situational anxiety  F41.8 300.09 busPIRone (BUSPAR) 5 mg tablet      15. Positive depression screening  Z13.31 796.4           PLAN    Orders Placed This Encounter    AMB SUPPLY ORDER     Home health ? [facility] for a aide to help her with ADL's.     LIPID PANEL     Standing Status:   Future     Standing Expiration Date:   8/2/9186    METABOLIC PANEL, COMPREHENSIVE     Standing Status:   Future     Standing Expiration Date:   3/4/2023    CBC W/O DIFF     Standing Status:   Future     Standing Expiration Date:   3/4/2023    TSH 3RD GENERATION     Standing Status:   Future     Standing Expiration Date:   3/3/2023    OCCULT BLOOD IMMUNOASSAY,DIAGNOSTIC     Standing Status:   Future     Number of Occurrences:   1     Standing Expiration Date:   9/1/2023     Order Specific Question:   QUEST SOURCE     Answer:   Stool [1161]    HEP B SURFACE AG     Standing Status:   Future     Standing Expiration Date:   3/2/2023    RPR     Standing Status:   Future     Standing Expiration Date:   3/2/2023    HEPATITIS C AB, RFLX TO QT BY PCR     Standing Status:   Future     Standing Expiration Date:   3/1/2023    HIV 1/2 AG/AB, 4TH GENERATION,W RFLX CONFIRM     Standing Status:   Future     Standing Expiration Date:   9/1/2023    atenoloL (TENORMIN) 25 mg tablet     Sig: Take 1 Tablet by mouth daily. Dispense:  90 Tablet     Refill:  1    furosemide (LASIX) 20 mg tablet     Sig: Take 1 Tablet by mouth daily. Dispense:  90 Tablet     Refill:  1    levothyroxine (SYNTHROID) 112 mcg tablet     Sig: TAKE 1 TABLET BY MOUTH ONCE DAILY     Dispense:  90 Tablet     Refill:  1    potassium chloride SR (KLOR-CON 10) 10 mEq tablet     Sig: Take 1 Tablet by mouth daily. Dispense:  90 Tablet     Refill:  1    simvastatin (ZOCOR) 20 mg tablet     Sig: Take 1 Tablet by mouth nightly. Dispense:  90 Tablet     Refill:  1    busPIRone (BUSPAR) 5 mg tablet     Sig: Take 1 Tablet by mouth three (3) times daily (with meals). As needed     Dispense:  90 Tablet     Refill:  1    meclizine (ANTIVERT) 25 mg tablet     Sig: TAKE 1 TABLET BY MOUTH THREE TIMES DAILY AS NEEDED FOR DIZZINESS     Dispense:  90 Tablet     Refill:  1    citalopram (CELEXA) 10 mg tablet     Sig: Take 1 Tablet by mouth daily. Dispense:  30 Tablet     Refill:  5     Discussed possible side affects, precautions, and drug interactions and possible benefits of the medication(s). 40 min spent with her    Samantha Kevin is a 71 y.o. female who was phone evaluated on 9/1/2022. Consent:  She and/or her healthcare decision maker is aware that this patient-initiated Telehealth encounter is a billable service, with coverage as determined by her insurance carrier. She is aware that she may receive a bill and has provided verbal consent to proceed: Yes    I was at home while conducting this encounter. Assessment & Plan:   Diagnoses and all orders for this visit:    1. Moderate episode of recurrent major depressive disorder ([facility] [address]    2. Essential hypertension, benign  -     METABOLIC PANEL, COMPREHENSIVE; Future  -     CBC W/O DIFF; Future  -     atenoloL (TENORMIN) 25 mg tablet; Take 1 Tablet by mouth daily. 3. Dyspnea on exertion    4. Tinea unguium    5. Weakness  -     AMB SUPPLY ORDER    6. Agoraphobia    7. Mourning    8. Screening cholesterol level  -     LIPID PANEL; Future    9. Hypothyroidism, unspecified type  -     TSH 3RD GENERATION; Future  -     levothyroxine (SYNTHROID) 112 mcg tablet; TAKE 1 TABLET BY MOUTH ONCE DAILY    10. Mixed hyperlipidemia  -     simvastatin (ZOCOR) 20 mg tablet; Take 1 Tablet by mouth nightly. 11. Screening for colon cancer  -     OCCULT BLOOD IMMUNOASSAY,DIAGNOSTIC; Future    12. Encounter for hepatitis C screening test for low risk patient  -     HEP B SURFACE AG; Future  -     RPR; Future  -     HEPATITIS C AB, RFLX TO QT BY PCR; Future  -     HIV 1/2 AG/AB, 4TH GENERATION,W RFLX CONFIRM; Future    13. Dyspnea, unspecified type  -     furosemide (LASIX) 20 mg tablet; Take 1 Tablet by mouth daily. -     potassium chloride SR (KLOR-CON 10) 10 mEq tablet; Take 1 Tablet by mouth daily. 14. Situational anxiety  -     busPIRone (BUSPAR) 5 mg tablet; Take 1 Tablet by mouth three (3) times daily (with meals). As needed    15. Positive depression screening    Other orders  -     meclizine (ANTIVERT) 25 mg tablet; TAKE 1 TABLET BY MOUTH THREE TIMES DAILY AS NEEDED FOR DIZZINESS  -     citalopram (CELEXA) 10 mg tablet; Take 1 Tablet by mouth daily. Follow-up and Dispositions    Return in about 4 weeks (around 9/29/2022) for routine follow up. 712  Subjective: We discussed the expected course, resolution and complications of the diagnosis(es) in detail. Medication risks, benefits, costs, interactions, and alternatives were discussed as indicated. I advised her to contact the office if her condition worsens, changes or fails to improve as anticipated. She expressed understanding with the diagnosis(es) and plan.      Pursuant to the emergency declaration under the [address] [facility], [address]5 waiver authority and the [place] [employer] and [employer] Supplemental Appropriations Act, this Virtual  Visit was conducted, with patient's consent, to reduce the patient's risk of exposure to COVID-19 and provide continuity of care for an established patient. Services were provided through a video synchronous discussion virtually to substitute for in-person clinic visit.     John Sahu MD

## 2022-09-12 ENCOUNTER — PATIENT OUTREACH (OUTPATIENT)
Dept: CASE MANAGEMENT | Age: 72
End: 2022-09-12

## 2022-10-12 ENCOUNTER — TELEPHONE (OUTPATIENT)
Dept: INTERNAL MEDICINE CLINIC | Age: 72
End: 2022-10-12

## 2022-10-12 NOTE — TELEPHONE ENCOUNTER
----- Message from Geoff Yaron sent at 10/11/2022  8:48 AM EDT -----  Subject: Message to Provider    QUESTIONS  Information for Provider? PT would like the office to call her.   ---------------------------------------------------------------------------  --------------  [address]  [phone]; OK to leave message on voicemail  ---------------------------------------------------------------------------  --------------  SCRIPT ANSWERS  Relationship to Patient?  Self

## 2022-11-16 ENCOUNTER — VIRTUAL VISIT (OUTPATIENT)
Dept: INTERNAL MEDICINE CLINIC | Age: 72
End: 2022-11-16
Payer: MEDICARE

## 2022-11-16 DIAGNOSIS — F43.21 GRIEVING: ICD-10-CM

## 2022-11-16 DIAGNOSIS — F41.9 ANXIETY: Primary | ICD-10-CM

## 2022-11-16 PROCEDURE — 99442 PR PHYS/QHP TELEPHONE EVALUATION 11-20 MIN: CPT | Performed by: FAMILY MEDICINE

## 2022-11-16 NOTE — PROGRESS NOTES
Chief Complaint   Patient presents with    Anxiety     Pt's mother passed away, anxiety high     Patient is aware that this is a Virtual Visit or Phone Call Only doctor's visit. Patient has not been out of the country in (14 months), NO diarrhea, NO cough, NO chest conjestion, NO temp. Pt has not been around anyone with these symptoms. Health Maintenance reviewed. I have reviewed the patient's medical history in detail and updated the computerized patient record. Have you been to the ER, urgent care clinic since your last visit? no Hospitalized since your last visit? No     2. Have you seen or consulted any other health care providers outside of the [address] [place] [address] since your last visit? No  Include any pap smears or colon screening. Encouraged pt to discuss pt's wishes with spouse/partner/family and bring them in the next appt to follow thru with the Advanced Directive      Fall Risk Assessment, last 12 mths 2/15/2021   Able to walk? Yes   Fall in past 12 months? 0   Do you feel unsteady?  0   Are you worried about falling 0       3 most recent PHQ Screens 9/1/2022   Little interest or pleasure in doing things Nearly every day   Feeling down, depressed, irritable, or hopeless Nearly every day   Total Score PHQ 2 6   Trouble falling or staying asleep, or sleeping too much More than half the days   Feeling tired or having little energy More than half the days   Poor appetite, weight loss, or overeating Not at all   Feeling bad about yourself - or that you are a failure or have let yourself or your family down Not at all   Trouble concentrating on things such as school, work, reading, or watching TV Not at all   Moving or speaking so slowly that other people could have noticed; or the opposite being so fidgety that others notice Not at all   Thoughts of being better off dead, or hurting yourself in some way Not at all   PHQ 9 Score 10       Abuse Screening Questionnaire 2/15/2021   Do you ever feel afraid of your partner? N   Are you in a relationship with someone who physically or mentally threatens you? N   Is it safe for you to go home?  Y       ADL Assessment 2/15/2021   Feeding yourself No Help Needed   Getting from bed to chair No Help Needed   Getting dressed No Help Needed   Bathing or showering No Help Needed   Walk across the room (includes cane/walker) No Help Needed   Using the telphone No Help Needed   Taking your medications No Help Needed   Preparing meals No Help Needed   Managing money (expenses/bills) No Help Needed   Moderately strenuous housework (laundry) No Help Needed   Shopping for personal items (toiletries/medicines) No Help Needed   Shopping for groceries No Help Needed   Driving No Help Needed   Climbing a flight of stairs No Help Needed   Getting to places beyond walking distances No Help Needed

## 2022-11-17 NOTE — PROGRESS NOTES
PROGRESS NOTE        SUBJECTIVE:  Diagnosis/Chief Complaint: Anxiety (Pt's mother passed away, anxiety high)      Doing well with mood no  Symptoms Agree with comments, see chief complaint. Suicidal: no  Side affects: no  States taking medications per medicine list.re    Family History   Problem Relation Age of Onset    Heart Disease Mother     OSTEOARTHRITIS Mother     Cancer Mother     Cancer Father         stomach    Psychiatric Disorder Sister     OSTEOARTHRITIS Sister     Psychiatric Disorder Daughter      Social History     Tobacco Use    Smoking status: Never    Smokeless tobacco: Never   Substance Use Topics    Alcohol use: No      SO now in [place]    OBJECTIVE:    . There were no vitals taken for this visit. Phone only    Reviewed: Medications, allergies, clinical lab test results and imaging results have been reviewed. Any abnormal findings have been addressed. ASSESSMENT:     Grieving    PLAN    Counceled    Valerie Romero is a 71 y.o. female who was phone evaluated on 11/16/2022. Consent:  She and/or her healthcare decision maker is aware that this patient-initiated Telehealth encounter is a billable service, with coverage as determined by her insurance carrier. She is aware that she may receive a bill and has provided verbal consent to proceed: Yes    I was in the office while conducting this encounter. Assessment & Plan:   Diagnoses and all orders for this visit:    1. Anxiety    2. Grieving            712  Subjective: We discussed the expected course, resolution and complications of the diagnosis(es) in detail. Medication risks, benefits, costs, interactions, and alternatives were discussed as indicated. I advised her to contact the office if her condition worsens, changes or fails to improve as anticipated. She expressed understanding with the diagnosis(es) and plan.      Pursuant to the emergency declaration under the [address] [facility], [address]36 waiver authority and the [employer] Act, this Virtual  Visit was conducted, with patient's consent, to reduce the patient's risk of exposure to COVID-19 and provide continuity of care for an established patient. Services were provided through a video synchronous discussion virtually to substitute for in-person clinic visit.     Nick Douglas MD

## 2024-07-27 NOTE — PROGRESS NOTES
Ambulatory Care Social Work   Follow Up Note  9/13/2022    Patient called and left a voicemail message to request information and resources for \"blood work and nail care at home. \"  She explained that she is weak, and wants labwork to determine the cause of this weakness. She states that she is unable to care for her toenails. She stated \"I do not leave my house, and I do not like to talk on the phone. \"  The patient made it known that she will only communicate via voicemail, and text messaging. This  left a voicemail message with the following information --      [address] [place] [address], #[phone]. Explained that they may not provide services in her area, but might be able to offer suggestions of providers in her area. Podiatrists located in [address], DPM and Aaron Poole DPM, Y#[phone] [place] T#[phone]. Explained         that they may only offer in office podiatry services, but she can always call to request a home visit. [facility] - W#[phone]. Advised that she call and ask to speak with an , to request home delivered meals,         homemaker services, and mobile nail services resources, if available/eligible. Explained via voicemail that this  would need for the patient to communicate via phone in order to provide complex case management services in the future. Offered support, as desired by the patient. Patient later called back, and spoke with this , to ask for a resource previously provided by colleague Misha Adames RN Case Manager. Called and spoke with Rowan Ruiz, to obtain this resource. Called the patient back, and provided information for [facility] home visiting service for lab work. Advised that she call [phone] to request an exception to their service area.        Updated the patient's PCP, and explained that this  will not be opening a Complex Case Management encounter, unless the patient agrees to regularly scheduled communication with this . No further social work outreach planned at this time.        Ernie Hall MSW/DEEP, [facility]   U#[phone] [Alert] : alert [Healthy Appearing] : healthy appearing [No Acute Distress] : no acute distress [Sclera] : the sclera and conjunctiva were normal [No Respiratory Distress] : no respiratory distress [No Acc Muscle Use] : no accessory muscle use [Respiration, Rhythm And Depth] : normal respiratory rhythm and effort [Auscultation Breath Sounds / Voice Sounds] : lungs were clear to auscultation bilaterally [Heart Rate And Rhythm] : heart rate was normal and rhythm regular [Normal S1, S2] : normal S1 and S2 [Bowel Sounds] : normal bowel sounds [Abdomen Tenderness] : non-tender [Abdomen Soft] : soft [Oriented To Time, Place, And Person] : oriented to person, place, and time [No CVA Tenderness] : no CVA  tenderness